# Patient Record
Sex: FEMALE | Race: WHITE | Employment: OTHER | URBAN - METROPOLITAN AREA
[De-identification: names, ages, dates, MRNs, and addresses within clinical notes are randomized per-mention and may not be internally consistent; named-entity substitution may affect disease eponyms.]

---

## 2017-11-13 ENCOUNTER — TRANSFERRED RECORDS (OUTPATIENT)
Dept: HEALTH INFORMATION MANAGEMENT | Facility: CLINIC | Age: 72
End: 2017-11-13

## 2018-05-23 ENCOUNTER — OFFICE VISIT (OUTPATIENT)
Dept: FAMILY MEDICINE | Facility: CLINIC | Age: 73
End: 2018-05-23
Payer: COMMERCIAL

## 2018-05-23 VITALS
BODY MASS INDEX: 24.75 KG/M2 | SYSTOLIC BLOOD PRESSURE: 118 MMHG | WEIGHT: 145 LBS | HEIGHT: 64 IN | OXYGEN SATURATION: 99 % | DIASTOLIC BLOOD PRESSURE: 67 MMHG | HEART RATE: 62 BPM | TEMPERATURE: 97.7 F

## 2018-05-23 DIAGNOSIS — F33.9 EPISODE OF RECURRENT MAJOR DEPRESSIVE DISORDER, UNSPECIFIED DEPRESSION EPISODE SEVERITY (H): ICD-10-CM

## 2018-05-23 DIAGNOSIS — Z76.89 ESTABLISHING CARE WITH NEW DOCTOR, ENCOUNTER FOR: Primary | ICD-10-CM

## 2018-05-23 DIAGNOSIS — R41.3 MEMORY LOSS: ICD-10-CM

## 2018-05-23 DIAGNOSIS — I10 ESSENTIAL HYPERTENSION: ICD-10-CM

## 2018-05-23 DIAGNOSIS — E78.5 HYPERLIPIDEMIA, UNSPECIFIED HYPERLIPIDEMIA TYPE: ICD-10-CM

## 2018-05-23 DIAGNOSIS — H81.09 MENIERE'S DISEASE, UNSPECIFIED LATERALITY: ICD-10-CM

## 2018-05-23 PROCEDURE — 99204 OFFICE O/P NEW MOD 45 MIN: CPT | Performed by: INTERNAL MEDICINE

## 2018-05-23 RX ORDER — HYDROCHLOROTHIAZIDE 25 MG/1
25 TABLET ORAL DAILY
Qty: 90 TABLET | Refills: 3 | Status: SHIPPED | OUTPATIENT
Start: 2018-05-23

## 2018-05-23 NOTE — MR AVS SNAPSHOT
After Visit Summary   5/23/2018    Ashli Moscoso    MRN: [id number]           Patient Information     Date Of Birth          1945        Visit Information        Provider Department      5/23/2018 11:20 AM Gauri Bolton MD [facility]        Today's Diagnoses     Establishing care with new doctor, encounter for    -  1    Meniere's disease, unspecified laterality        Hyperlipidemia, unspecified hyperlipidemia type        Essential hypertension        Memory loss        Episode of recurrent major depressive disorder, unspecified depression episode severity (H)           Follow-ups after your visit        Follow-up notes from your care team     Return in about 3 months (around 8/23/2018).      Your next 10 appointments already scheduled     Jul 23, 2018 12:40 PM CDT   Office Visit with Gauri Bolton MD   [facility]    [address] [facility] [id number] [phone]           Bring a current list of meds and any records pertaining to this visit. For Physicals, please bring immunization records and any forms needing to be filled out. Please arrive 10 minutes early to complete paperwork.              Who to contact     If you have questions or need follow up information about today's clinic visit or your schedule please contact [facility] directly at [phone].  Normal or non-critical lab and imaging results will be communicated to you by MyChart, letter or phone within 4 business days after the clinic has received the results. If you do not hear from us within 7 days, please contact the clinic through MyChart or phone. If you have a critical or abnormal lab result, we will notify you by phone as soon as possible.  Submit refill requests through Tablo or call your pharmacy and they will forward the refill request to us. Please allow 3 business days for your refill to be completed.          Additional Information  "About Your Visit        Care EveryWhere ID     This is your Care EveryWhere ID. This could be used by other organizations to access your [place] medical records  [id number]        Your Vitals Were     Pulse Temperature Height Pulse Oximetry BMI (Body Mass Index)       62 97.7  F (36.5  C) (Oral) 5' 3.8\" (1.621 m) 99% 25.05 kg/m2        Blood Pressure from Last 3 Encounters:   05/23/18 118/67   02/24/12 136/80    Weight from Last 3 Encounters:   05/23/18 145 lb (65.8 kg)   02/24/12 188 lb 12.8 oz (85.6 kg)              Today, you had the following     No orders found for display         Where to get your medicines      These medications were sent to [facility] [address] [place] [id number]     Phone:  [phone]     hydrochlorothiazide 25 MG tablet          Primary Care Provider Office Phone # Fax #    Gauri Slade Bolton [phone] [address] [address]        Equal Access to Services     [facility]: Hadii aad ku hadasho Sogamal, waaxda luqadaha, qaybta kaalmada adesusanyada, jeffery hall . So [facility] [phone].    ATENCIÓN: Si habla español, tiene a vides disposición servicios gratuitos de asistencia lingüística. Llame al [phone].    We comply with applicable federal civil rights laws and [place] laws. We do not discriminate on the basis of race, color, national origin, age, disability, sex, sexual orientation, or gender identity.            Thank you!     Thank you for choosing [facility]  for your care. Our goal is always to provide you with excellent care. Hearing back from our patients is one way we can continue to improve our services. Please take a few minutes to complete the written survey that you may receive in the mail after your visit with us. Thank you!             Your Updated Medication List - Protect others around you: Learn how to safely " use, store and throw away your medicines at www.disposemymeds.org.          This list is accurate as of 5/23/18 11:59 PM.  Always use your most recent med list.                   Brand Name Dispense Instructions for use Diagnosis    acyclovir 5 % ointment    ZOVIRAX    15 g    Apply  topically 6 times daily.    Herpes stomatitis       CITRACAL + D PO      Take  by mouth.        CLARITIN 10 MG tablet   Generic drug:  loratadine      Take 10 mg by mouth daily.        escitalopram 10 MG tablet    LEXAPRO    90 tablet    Take 1 tablet by mouth daily.    Depressive disorder, not elsewhere classified       fenofibrate 160 MG tablet     90 tablet    Take 1 tablet by mouth daily.    Hyperlipidemia       hydrochlorothiazide 25 MG tablet    HYDRODIURIL    90 tablet    Take 1 tablet (25 mg) by mouth daily    Essential hypertension       lisinopril 10 MG tablet    PRINIVIL/ZESTRIL    90 tablet    Take 1 tablet by mouth daily.    Unspecified essential hypertension       meclizine 25 MG tablet    ANTIVERT     Take 25 mg by mouth 2 times daily.        metoprolol tartrate 50 MG tablet    LOPRESSOR    60 tablet    Take 1 tablet by mouth 2 times daily.    Essential hypertension       niacin 100 MG tablet      Take 100 mg by mouth 2 times daily.        NYSTOP 801766 UNIT/GM Powd   Generic drug:  nystatin     2 Bottle    Externally apply 15 g topically 2 times daily.    Intertrigo       oxazepam 10 MG capsule    SERAX     Take 10 mg by mouth nightly as needed.        simvastatin 20 MG tablet    ZOCOR    90 tablet    Take 1 tablet by mouth At Bedtime.    Hyperlipidemia       SLOW  (50 Fe) MG tablet   Generic drug:  ferrous sulfate Dried      Take 1 tablet by mouth daily (with breakfast).

## 2018-05-23 NOTE — Clinical Note
Please abstract the following data from this visit with this patient into the appropriate field in Epic:  Mammogram done on this date: 11/13/2017 (approximately), by this group: [facility]

## 2018-05-23 NOTE — PROGRESS NOTES
SUBJECTIVE:   Ashli Moscoso is a 72 year old female who presents to clinic today for the following health issues:      New Patient/Transfer of Care  Establish care    HPI:   Patient Ashli Moscoso is a very pleasant 72 year old female with history of Meniere's Disease who presents to Internal Medicine clinic today to establish care and for follow up of multiple concerns. Regarding the patient's chronic Meniere's disease in both ears, the patient's symptoms are currently stable on Meclizine medication. Regarding the patient's chronic hyperlipidemia, the patient is compliant with her Simvastatin, niacin and Fenofibrate medications. Patient's HTN is currently well controlled. Patient's depression symptoms are currently well controlled on the Lexapro medication. Patient also has chronic mild memory loss symptoms with a known family history of dementia. Patient's father previously had dementia. Patient is not interested in a neurology specialist referral at this time for her memory loss symptoms. Patient denies any chest pain, headaches, fever or chills.        Current Medications:     Current Outpatient Prescriptions   Medication Sig Dispense Refill     acyclovir (ZOVIRAX) 5 % ointment Apply  topically 6 times daily. 15 g 3     Calcium Citrate-Vitamin D (CITRACAL + D PO) Take  by mouth.       escitalopram (LEXAPRO) 10 MG tablet Take 1 tablet by mouth daily. 90 tablet 1     fenofibrate 160 MG tablet Take 1 tablet by mouth daily. 90 tablet 0     Ferrous Sulfate Dried (SLOW FE) 160 (50 FE) MG tablet Take 1 tablet by mouth daily (with breakfast).       hydrochlorothiazide (HYDRODIURIL) 25 MG tablet Take 1 tablet (25 mg) by mouth daily 90 tablet 3     lisinopril (PRINIVIL,ZESTRIL) 10 MG tablet Take 1 tablet by mouth daily. 90 tablet 1     loratadine (CLARITIN) 10 MG tablet Take 10 mg by mouth daily.       meclizine (ANTIVERT) 25 MG tablet Take 25 mg by mouth 2 times daily.       metoprolol (LOPRESSOR) 50 MG tablet  Take 1 tablet by mouth 2 times daily. 60 tablet 12     niacin 100 MG tablet Take 100 mg by mouth 2 times daily.       Nystatin (NYSTOP) 261171 UNIT/GM POWD Externally apply 15 g topically 2 times daily. 2 Bottle 6     oxazepam (SERAX) 10 MG capsule Take 10 mg by mouth nightly as needed.       simvastatin (ZOCOR) 20 MG tablet Take 1 tablet by mouth At Bedtime. 90 tablet 0     [DISCONTINUED] hydrochlorothiazide (HYDRODIURIL) 25 MG tablet Take 1 tablet by mouth daily. 90 tablet 0         Allergies:      Allergies   Allergen Reactions     Azithromycin Unknown     Bacitracin Unknown     Ciprofloxacin Unknown     Patient does not want to be prescribed this medication     Codeine Unknown     Diazepam Unknown     Epinephrine Other (See Comments)     Can have a small dose     Moxifloxacin Unknown     Prochlorperazine Unknown     Rofecoxib Unknown     Sulfa Drugs      Latex Rash            Past Medical History:     Past Medical History:   Diagnosis Date     Hyperlipidemia      Meniere's disease      Unspecified essential hypertension          Past Surgical History:     Past Surgical History:   Procedure Laterality Date     HC REPAIR FEMORAL HERNIA,BRENDA           Family Medical History:     Family History   Problem Relation Age of Onset     HEART DISEASE Mother      Dementia Father          Social History:     Social History     Social History     Marital status:      Spouse name: N/A     Number of children: N/A     Years of education: N/A     Occupational History     Not on file.     Social History Main Topics     Smoking status: Never Smoker     Smokeless tobacco: Never Used     Alcohol use No     Drug use: No     Sexual activity: Not Currently     Partners: Male     Other Topics Concern     Not on file     Social History Narrative           Review of System:     Constitutional: Negative for fever or chills  Skin: Negative for rashes  Ears/Nose/Throat: Negative for nasal congestion, sore throat. Positive for chronic  "meniere's disease of both ears  Respiratory: No shortness of breath, dyspnea on exertion, cough, or hemoptysis  Cardiovascular: Negative for chest pain  Gastrointestinal: Negative for nausea, vomiting  Genitourinary: Negative for dysuria, hematuria  Musculoskeletal: Negative for myalgias  Neurologic: Negative for headaches, positive for chronic mild short term memory loss  Psychiatric: Positive for depression  Hematologic/Lymphatic/Immunologic: Negative  Endocrine: Negative  Behavioral: Negative for tobacco use       Physical Exam:   /67 (BP Location: Right arm, Cuff Size: Adult Regular)  Pulse 62  Temp 97.7  F (36.5  C) (Oral)  Ht 5' 3.8\" (1.621 m)  Wt 145 lb (65.8 kg)  SpO2 99%  BMI 25.05 kg/m2    GENERAL: alert and no distress  EYES: eyes grossly normal to inspection, and conjunctivae and sclerae normal  HENT: Normocephalic atraumatic. Nose and mouth without ulcers or lesions. No signs of vertigo.  NECK: supple  RESP: lungs clear to auscultation   CV: regular rate and rhythm, normal S1 S2  LYMPH: no peripheral edema   ABDOMEN: nondistended  MS: no gross musculoskeletal defects noted  SKIN: no suspicious lesions or rashes  NEURO: Alert & Oriented x 3. Short term memory loss symptoms present.  PSYCH: mentation appears normal, affect normal        Diagnostic Test Results:     Diagnostic Test Results:  Results for orders placed or performed in visit on 12/09/11   iFOB - Immunlogical Fecal Occult (CIM)   Result Value Ref Range    Occult Blood NEG neg       ASSESSMENT/PLAN:       (Z76.89) Establishing care with new doctor, encounter for  (primary encounter diagnosis)  (H81.09) Meniere's disease, unspecified laterality  Comment: chronic Meniere's disease, currently stable on Meclizine medication  Plan: continue Meclizine medication going forward.      (E78.5) Hyperlipidemia, unspecified hyperlipidemia type  Comment: stable on current cholesterol medication regimen. patient's compliant with her cholesterol " medications including niacin, simvastatin, fenofibrate.   Plan: Continue current cholesterol medications going forward.      (I10) Essential hypertension  Comment: BP currently well controlled  Plan: I have ordered hydrochlorothiazide (HYDRODIURIL) 25 MG tablet refill today.      (R41.3) Memory loss  Comment: chronic mild memory loss symptoms with a known family history of dementia. Patient's father previously had dementia.  Plan: patient declined a neurology specialist clinic referral at this time for memory loss evaluation.      (F33.9) Episode of recurrent major depressive disorder, unspecified depression episode severity (H)  Comment: patient's depression symptoms are currently well controlled.  Plan: Continue current Lexapro antidepressant medication for treatment going forward.      Follow Up Plan:     Patient is instructed to return to Internal Medicine clinic for follow-up visit in 3 months.        Gauri Bolton MD  Internal Medicine  [facility]

## 2018-05-24 PROBLEM — F33.9 EPISODE OF RECURRENT MAJOR DEPRESSIVE DISORDER, UNSPECIFIED DEPRESSION EPISODE SEVERITY (H): Status: ACTIVE | Noted: 2018-05-24

## 2018-06-18 ENCOUNTER — TELEPHONE (OUTPATIENT)
Dept: FAMILY MEDICINE | Facility: CLINIC | Age: 73
End: 2018-06-18

## 2018-06-18 DIAGNOSIS — R42 DIZZINESS: Primary | ICD-10-CM

## 2018-06-18 NOTE — TELEPHONE ENCOUNTER
Reason for Call: Request for an order or referral:    Order or referral being requested: Neurologist    Date needed: at your convenience    Has the patient been seen by the PCP for this problem? YES    Additional comments: Please leave the name and number of the Neurologist they discussed at the time of the appointment.    Phone number Patient can be reached at:  Home number on file [phone] (home)    Best Time:  Any     Can we leave a detailed message on this number?  YES    Call taken on 6/18/2018 at 9:24 AM by Marta Blackwood

## 2018-06-18 NOTE — TELEPHONE ENCOUNTER
Please notify pt that new neurology referral made today and to call to schedule with    [facility]: Neurology Clinic [facility] [phone]   [email or web address]  General Neurology

## 2018-06-19 NOTE — TELEPHONE ENCOUNTER
"Pt needs the name of the person Dr. Bolton recommends. Pt needs Dr. Bolton to call him.  He wants the information \"from the horses mouth\".  He needs to know by tomorrow morning.  He will be waiting by the phone to hear from Dr. Bolton for the specific information about the name of the doctor, and the address.  Please call pt at: [phone]  "

## 2018-06-20 NOTE — TELEPHONE ENCOUNTER
Please contact this patient.  The  is desperate for this information. [phone].  This is the 's cell phone please call them.  It is ok to leave a message.  They are looking for very specific information about the referral for the neurologist.  They have been given the information that Dr. Bolton left in the message, but they want the exact name of the doctor, their location, and phone number.

## 2018-06-20 NOTE — TELEPHONE ENCOUNTER
I called Ronald, patient's  ([facility] on file), I gave him the information from the Neurology referral.   Areli Cobos MA

## 2018-06-23 ENCOUNTER — HOSPITAL ENCOUNTER (EMERGENCY)
Facility: CLINIC | Age: 73
Discharge: HOME | End: 2018-06-23
Attending: INTERNAL MEDICINE | Admitting: INTERNAL MEDICINE
Payer: MEDICARE

## 2018-06-23 ENCOUNTER — APPOINTMENT (OUTPATIENT)
Dept: CT IMAGING | Facility: CLINIC | Age: 73
End: 2018-06-23
Attending: INTERNAL MEDICINE
Payer: MEDICARE

## 2018-06-23 VITALS
TEMPERATURE: 97.5 F | OXYGEN SATURATION: 100 % | HEART RATE: 66 BPM | RESPIRATION RATE: 16 BRPM | HEIGHT: 65 IN | WEIGHT: 130 LBS | BODY MASS INDEX: 21.66 KG/M2 | SYSTOLIC BLOOD PRESSURE: 128 MMHG | DIASTOLIC BLOOD PRESSURE: 74 MMHG

## 2018-06-23 DIAGNOSIS — R41.3 MEMORY LOSS: ICD-10-CM

## 2018-06-23 LAB
ALBUMIN SERPL-MCNC: 4.1 G/DL (ref 3.4–5)
ALBUMIN UR-MCNC: NEGATIVE MG/DL
ALP SERPL-CCNC: 52 U/L (ref 40–150)
ALT SERPL W P-5'-P-CCNC: 24 U/L (ref 0–50)
ANION GAP SERPL CALCULATED.3IONS-SCNC: 11 MMOL/L (ref 3–14)
APPEARANCE UR: CLEAR
AST SERPL W P-5'-P-CCNC: 18 U/L (ref 0–45)
BACTERIA #/AREA URNS HPF: ABNORMAL /HPF
BASOPHILS # BLD AUTO: 0 10E9/L (ref 0–0.2)
BASOPHILS NFR BLD AUTO: 0.3 %
BILIRUB SERPL-MCNC: 0.5 MG/DL (ref 0.2–1.3)
BILIRUB UR QL STRIP: NEGATIVE
BUN SERPL-MCNC: 18 MG/DL (ref 7–30)
CALCIUM SERPL-MCNC: 9.2 MG/DL (ref 8.5–10.1)
CHLORIDE SERPL-SCNC: 102 MMOL/L (ref 94–109)
CO2 SERPL-SCNC: 26 MMOL/L (ref 20–32)
COLOR UR AUTO: ABNORMAL
CREAT SERPL-MCNC: 0.76 MG/DL (ref 0.52–1.04)
DIFFERENTIAL METHOD BLD: NORMAL
EOSINOPHIL # BLD AUTO: 0.2 10E9/L (ref 0–0.7)
EOSINOPHIL NFR BLD AUTO: 3.7 %
ERYTHROCYTE [DISTWIDTH] IN BLOOD BY AUTOMATED COUNT: 12.7 % (ref 10–15)
GFR SERPL CREATININE-BSD FRML MDRD: 75 ML/MIN/1.7M2
GLUCOSE SERPL-MCNC: 98 MG/DL (ref 70–99)
GLUCOSE UR STRIP-MCNC: NEGATIVE MG/DL
HCT VFR BLD AUTO: 36.9 % (ref 35–47)
HGB BLD-MCNC: 12.8 G/DL (ref 11.7–15.7)
HGB UR QL STRIP: NEGATIVE
IMM GRANULOCYTES # BLD: 0 10E9/L (ref 0–0.4)
IMM GRANULOCYTES NFR BLD: 0.3 %
KETONES UR STRIP-MCNC: NEGATIVE MG/DL
LEUKOCYTE ESTERASE UR QL STRIP: NEGATIVE
LYMPHOCYTES # BLD AUTO: 1.5 10E9/L (ref 0.8–5.3)
LYMPHOCYTES NFR BLD AUTO: 23.3 %
MCH RBC QN AUTO: 31.8 PG (ref 26.5–33)
MCHC RBC AUTO-ENTMCNC: 34.7 G/DL (ref 31.5–36.5)
MCV RBC AUTO: 92 FL (ref 78–100)
MONOCYTES # BLD AUTO: 0.3 10E9/L (ref 0–1.3)
MONOCYTES NFR BLD AUTO: 4.3 %
MUCOUS THREADS #/AREA URNS LPF: PRESENT /LPF
NEUTROPHILS # BLD AUTO: 4.2 10E9/L (ref 1.6–8.3)
NEUTROPHILS NFR BLD AUTO: 68.1 %
NITRATE UR QL: NEGATIVE
NRBC # BLD AUTO: 0 10*3/UL
NRBC BLD AUTO-RTO: 0 /100
PH UR STRIP: 6.5 PH (ref 5–7)
PLATELET # BLD AUTO: 273 10E9/L (ref 150–450)
POTASSIUM SERPL-SCNC: 3.4 MMOL/L (ref 3.4–5.3)
PROT SERPL-MCNC: 6.7 G/DL (ref 6.8–8.8)
RBC # BLD AUTO: 4.03 10E12/L (ref 3.8–5.2)
RBC #/AREA URNS AUTO: <1 /HPF (ref 0–2)
SODIUM SERPL-SCNC: 138 MMOL/L (ref 133–144)
SOURCE: ABNORMAL
SP GR UR STRIP: 1 (ref 1–1.03)
SQUAMOUS #/AREA URNS AUTO: <1 /HPF (ref 0–1)
TROPONIN I SERPL-MCNC: <0.015 UG/L (ref 0–0.04)
TSH SERPL DL<=0.005 MIU/L-ACNC: 2.31 MU/L (ref 0.4–4)
UROBILINOGEN UR STRIP-MCNC: NORMAL MG/DL (ref 0–2)
VIT B12 SERPL-MCNC: 1176 PG/ML (ref 193–986)
WBC # BLD AUTO: 6.2 10E9/L (ref 4–11)
WBC #/AREA URNS AUTO: <1 /HPF (ref 0–5)

## 2018-06-23 PROCEDURE — 84443 ASSAY THYROID STIM HORMONE: CPT | Performed by: INTERNAL MEDICINE

## 2018-06-23 PROCEDURE — 80053 COMPREHEN METABOLIC PANEL: CPT | Performed by: INTERNAL MEDICINE

## 2018-06-23 PROCEDURE — 93005 ELECTROCARDIOGRAM TRACING: CPT

## 2018-06-23 PROCEDURE — 82607 VITAMIN B-12: CPT | Performed by: INTERNAL MEDICINE

## 2018-06-23 PROCEDURE — 81001 URINALYSIS AUTO W/SCOPE: CPT | Performed by: INTERNAL MEDICINE

## 2018-06-23 PROCEDURE — 84484 ASSAY OF TROPONIN QUANT: CPT | Performed by: INTERNAL MEDICINE

## 2018-06-23 PROCEDURE — 99285 EMERGENCY DEPT VISIT HI MDM: CPT | Mod: 25

## 2018-06-23 PROCEDURE — 70450 CT HEAD/BRAIN W/O DYE: CPT

## 2018-06-23 PROCEDURE — 85025 COMPLETE CBC W/AUTO DIFF WBC: CPT | Performed by: INTERNAL MEDICINE

## 2018-06-23 ASSESSMENT — ENCOUNTER SYMPTOMS
NAUSEA: 0
VOMITING: 0
CONFUSION: 1
COUGH: 0
HEADACHES: 0
ABDOMINAL PAIN: 0
FEVER: 0
BACK PAIN: 0

## 2018-06-23 NOTE — ED TRIAGE NOTES
"Presents ambulatory with  and daughter  C/o memory issues off on and for over a year.   Called Dr. Lawrence ([facility]) yesterday with concerns for memory. Call returned today and was recommended to go to ER to see neurologist \"to start the process for dementia testing\"  Recently experienced some delirium while out of town at family birthday party  Did not recognize  last night.  Alert. Oriented to self, place, situation. Disoriented to time.  "

## 2018-06-23 NOTE — CONSULTS
"Neurology Consultation    Ashli Moscoso    73 year old       Reason for consult: I was asked by Dr. Lindsey to evaluate this patient for memory loss.          HPI:   Mrs. Moscoso is a 72 yo woman with h/o Meniere's disease and memory loss who presents for evaluation of memory loss.  Much of history of obtained from  and daughter.  She has had progressive memory loss for over 2 years now.  Most notably, she repeats questions and statements that her family notes that she has said in the past minute.  She also seems to get lost in the house.  Her daughter notes that this past weekend she commented on a picture of her relatives, then about 1 minute later was unsure who was in the picture. Last night her  was laying in bed and asked her to go to bed, she said \"but you're not my .\" This is the first time she has not recognized him. Later in the evening he woke to find that the TV in their room was on and she was asleep in front of another TV in the living room. Today she left a confusing message for her son in law asking whether she should call the police.  Her symptoms have improved since.  Family notes that her symptoms generally get worse at night.  They called her ENT physician who told her to go to the emergency department for dementia workup. She has occasional shortness of breath.  No fever, chest pain, jaundice, or easy bruising or bleeding.  No recent falls.                 Past Medical History:     Past Medical History:   Diagnosis Date     Hyperlipidemia      Meniere's disease      Unspecified essential hypertension               Past Surgical History:     Past Surgical History:   Procedure Laterality Date     HC REPAIR FEMORAL HERNIA,BRENDA                Social History:     Social History   Substance Use Topics     Smoking status: Never Smoker     Smokeless tobacco: Never Used     Alcohol use No              Family History:     Family History   Problem Relation Age of Onset     HEART " "DISEASE Mother      Dementia Father               Allergies:     Allergies   Allergen Reactions     Azithromycin Unknown     Bacitracin Unknown     Ciprofloxacin Unknown     Patient does not want to be prescribed this medication     Codeine Unknown     Diazepam Unknown     Epinephrine Other (See Comments)     Can have a small dose     Moxifloxacin Unknown     Prochlorperazine Unknown     Rofecoxib Unknown     Sulfa Drugs      Latex Rash             Medications:     No current facility-administered medications for this encounter.      Current Outpatient Prescriptions   Medication Sig     ASPIRIN PO Take 81 mg by mouth daily     acyclovir (ZOVIRAX) 5 % ointment Apply  topically 6 times daily.     Calcium Citrate-Vitamin D (CITRACAL + D PO) Take  by mouth.     escitalopram (LEXAPRO) 10 MG tablet Take 1 tablet by mouth daily.     fenofibrate 160 MG tablet Take 1 tablet by mouth daily.     Ferrous Sulfate Dried (SLOW FE) 160 (50 FE) MG tablet Take 1 tablet by mouth daily (with breakfast).     hydrochlorothiazide (HYDRODIURIL) 25 MG tablet Take 1 tablet (25 mg) by mouth daily     lisinopril (PRINIVIL,ZESTRIL) 10 MG tablet Take 1 tablet by mouth daily.     loratadine (CLARITIN) 10 MG tablet Take 10 mg by mouth daily.     meclizine (ANTIVERT) 25 MG tablet Take 25 mg by mouth 2 times daily.     metoprolol (LOPRESSOR) 50 MG tablet Take 1 tablet by mouth 2 times daily.     niacin 100 MG tablet Take 100 mg by mouth 2 times daily.     Nystatin (NYSTOP) 882590 UNIT/GM POWD Externally apply 15 g topically 2 times daily.     oxazepam (SERAX) 10 MG capsule Take 10 mg by mouth nightly as needed.     simvastatin (ZOCOR) 20 MG tablet Take 1 tablet by mouth At Bedtime.              Review of Systems:   Review of systems negative unless noted in HPI          Physical Exam:   Vital signs:  Temp: 97.5  F (36.4  C)   BP: 143/68 Pulse: 66   Resp: 16 SpO2: 100 % O2 Device: None (Room air)   Height: 165.1 cm (5' 5\") Weight: 59 kg (130 " "lb)  Estimated body mass index is 21.63 kg/(m^2) as calculated from the following:    Height as of this encounter: 1.651 m (5' 5\").    Weight as of this encounter: 59 kg (130 lb).      Constitutional : well-built  Head: atraumatic, anicteric. See neuroexam  Eyes: see neuroexam  CVC: RRR  LUng: No respiratory distress.   Adomen: soft, non tender, bowel movements heard  Extremities: Pulses preserved in four extremities. No edema. Well perfused  Psychiatry: in good mood. Collaborative  -Neurological:     --MS: Patient is alert, attentive, and oriented. Speech is clear and fluid. Registration intact but only recalls 1 of 3 words after distraction.    --CNs: Visual fields are full to confrontation. Normal fundoscopic exam with no visualized vascular changes. Pupils are briskly reactive to light. Visual fields full. Ocular motility full without nystagmus, facial sensation intact, muscles of mastication and facial expression normal, hearing intact, gag and palate elevation normal, sternomastoid and trapezius function normal, tongue motions normal     --Motor: Normal muscle tone and bulk. 5/5 muscle strength bilaterally     --Reflexes: 3+ symmetric throughout. Flores present bilaterally. Toes downgoing bilaterally. Glabellar and palmomental absent.    --Sensory: Light touch, vibration and PP intact bilaterally in upper and lower extremities.  Hot/cold difficult to interpret.    --Coordination: Heel-shin and finger-nose-finger is intact. Negative Romberg.     --Gait: Stands with feet normally spaced. Gait is steady.              Data:   All laboratory and imaging data in the past 24 hours reviewed  All cardiac studies reviewed by me.  All imaging studies reviewed by me.         Assessment and Plan:   #Memory difficulties  Ms. Moscoso's history is consistent with dementia, most likely alzheimer's.  The 2 year history and the pattern of her deficits do not raise concern for acute process.  She can safely discharge from the " emergency department with outpatient neurology follow up.  She eats plenty of meat making B12 deficiency less likely, though this should be assessed for.  Also, she should have TSH checked. Flores likely secondary to frontal release.    -TSH  -B12  -f/u neurology clinic       Paul Ramirez  PGY2 Neurology  [id number]    Pt not seen. Case discussed with Dr. Ramirez by phone. Agree with his assessment and recommendations.  INGRIS Ordonez MD

## 2018-06-23 NOTE — ED PROVIDER NOTES
"    [place] EMERGENCY DEPARTMENT ([facility])  6/23/18   History     Chief Complaint   Patient presents with     Memory Loss     HPI  Ashli Moscoso is a 73 year old female with a medical history significant for Ménière's disease and memory loss who presents to the Emergency Department for evaluation of worsening memory loss.  Patient has had memory issues on and off for the past few years and it has been getting worse more recently.  The patient's last night did not recognize her  which caused them to call their ENT doctor, Dr. Lawrence, and was recommended to come to the ER to see neurologist to \"start the process for dementia testing\".  The family also reports that the patient had increased confusion recently while they were out of town.  The patient is having difficulty remembering specific events here in the Emergency Department and is repeating things.  Patient reports she has been taking all her medications as prescribed.  She denies any acute symptoms including cough, chest pain, fevers, nausea, vomiting, abdominal pain, back pain or headaches.    I have reviewed the Medications, Allergies, Past Medical and Surgical History, and Social History in the rSmart system.    Past Medical History:   Diagnosis Date     Hyperlipidemia      Meniere's disease      Unspecified essential hypertension        Past Surgical History:   Procedure Laterality Date     HC REPAIR FEMORAL HERNIA,BRENDA         Family History   Problem Relation Age of Onset     HEART DISEASE Mother      Dementia Father        Social History   Substance Use Topics     Smoking status: Never Smoker     Smokeless tobacco: Never Used     Alcohol use No       No current facility-administered medications for this encounter.      Current Outpatient Prescriptions   Medication     ASPIRIN PO     acyclovir (ZOVIRAX) 5 % ointment     Calcium Citrate-Vitamin D (CITRACAL + D PO)     escitalopram (LEXAPRO) 10 MG tablet     fenofibrate 160 MG " "tablet     Ferrous Sulfate Dried (SLOW FE) 160 (50 FE) MG tablet     hydrochlorothiazide (HYDRODIURIL) 25 MG tablet     lisinopril (PRINIVIL,ZESTRIL) 10 MG tablet     loratadine (CLARITIN) 10 MG tablet     meclizine (ANTIVERT) 25 MG tablet     metoprolol (LOPRESSOR) 50 MG tablet     niacin 100 MG tablet     Nystatin (NYSTOP) 193359 UNIT/GM POWD     oxazepam (SERAX) 10 MG capsule     simvastatin (ZOCOR) 20 MG tablet        Allergies   Allergen Reactions     Azithromycin Unknown     Bacitracin Unknown     Ciprofloxacin Unknown     Patient does not want to be prescribed this medication     Codeine Unknown     Diazepam Unknown     Epinephrine Other (See Comments)     Can have a small dose     Moxifloxacin Unknown     Prochlorperazine Unknown     Rofecoxib Unknown     Sulfa Drugs      Latex Rash         Review of Systems   Constitutional: Negative for fever.   Respiratory: Negative for cough.    Cardiovascular: Negative for chest pain.   Gastrointestinal: Negative for abdominal pain, nausea and vomiting.   Musculoskeletal: Negative for back pain.   Neurological: Negative for headaches.   Psychiatric/Behavioral: Positive for confusion.   All other systems reviewed and are negative.      Physical Exam   BP: 143/68  Pulse: 66  Temp: 97.5  F (36.4  C)  Resp: 16  Height: 165.1 cm (5' 5\")  Weight: 59 kg (130 lb)  SpO2: 100 %      Physical Exam   Constitutional: She is oriented to person, place, and time. No distress.   HENT:   Head: Atraumatic.   Mouth/Throat: Oropharynx is clear and moist. No oropharyngeal exudate.   Eyes: Pupils are equal, round, and reactive to light. No scleral icterus.   Neck: Neck supple. No JVD present.   Cardiovascular: Normal rate, normal heart sounds and intact distal pulses.  Exam reveals no gallop and no friction rub.    No murmur heard.  Pulmonary/Chest: Effort normal and breath sounds normal. No respiratory distress. She has no wheezes. She has no rales. She exhibits no tenderness.   Abdominal: " Soft. Bowel sounds are normal. She exhibits no distension and no mass. There is no tenderness. There is no rebound and no guarding.   Musculoskeletal: She exhibits no edema or tenderness.   Neurological: She is alert and oriented to person, place, and time. No cranial nerve deficit. Coordination normal.   No short term memory   Skin: Skin is warm. No rash noted. She is not diaphoretic.   Psychiatric: She has a normal mood and affect. Her behavior is normal. Judgment and thought content normal.       ED Course   3:09 PM  The patient was seen and examined by Carrie Lindsey MD in Room ED08.     ED Course     Procedures             EKG Interpretation:      Interpreted by Carrie Lindsey MD  Time reviewed: 15:30  Symptoms at time of EKG: memory loss   Rhythm: normal sinus   Rate: 66 bpm  Axis: Normal  Ectopy: none  Conduction: normal  ST Segments/ T Waves: ST & T wave abnormality, consider inferior ischemia  Q Waves: none  Comparison to prior: No old EKG available    Clinical Impression: ST depression @ IW/LW, no old      Results for orders placed or performed during the hospital encounter of 06/23/18 (from the past 24 hour(s))   EKG 12-lead, tracing only     Status: None (Preliminary result)    Collection Time: 06/23/18  3:30 PM   Result Value Ref Range    Interpretation ECG Click View Image link to view waveform and result    CBC with platelets differential     Status: None    Collection Time: 06/23/18  3:43 PM   Result Value Ref Range    WBC 6.2 4.0 - 11.0 10e9/L    RBC Count 4.03 3.8 - 5.2 10e12/L    Hemoglobin 12.8 11.7 - 15.7 g/dL    Hematocrit 36.9 35.0 - 47.0 %    MCV 92 78 - 100 fl    MCH 31.8 26.5 - 33.0 pg    MCHC 34.7 31.5 - 36.5 g/dL    RDW 12.7 10.0 - 15.0 %    Platelet Count 273 150 - 450 10e9/L    Diff Method Automated Method     % Neutrophils 68.1 %    % Lymphocytes 23.3 %    % Monocytes 4.3 %    % Eosinophils 3.7 %    % Basophils 0.3 %    % Immature Granulocytes 0.3 %    Nucleated RBCs 0 0 /100     Absolute Neutrophil 4.2 1.6 - 8.3 10e9/L    Absolute Lymphocytes 1.5 0.8 - 5.3 10e9/L    Absolute Monocytes 0.3 0.0 - 1.3 10e9/L    Absolute Eosinophils 0.2 0.0 - 0.7 10e9/L    Absolute Basophils 0.0 0.0 - 0.2 10e9/L    Abs Immature Granulocytes 0.0 0 - 0.4 10e9/L    Absolute Nucleated RBC 0.0    Comprehensive metabolic panel     Status: Abnormal    Collection Time: 06/23/18  3:43 PM   Result Value Ref Range    Sodium 138 133 - 144 mmol/L    Potassium 3.4 3.4 - 5.3 mmol/L    Chloride 102 94 - 109 mmol/L    Carbon Dioxide 26 20 - 32 mmol/L    Anion Gap 11 3 - 14 mmol/L    Glucose 98 70 - 99 mg/dL    Urea Nitrogen 18 7 - 30 mg/dL    Creatinine 0.76 0.52 - 1.04 mg/dL    GFR Estimate 75 >60 mL/min/1.7m2    GFR Estimate If Black >90 >60 mL/min/1.7m2    Calcium 9.2 8.5 - 10.1 mg/dL    Bilirubin Total 0.5 0.2 - 1.3 mg/dL    Albumin 4.1 3.4 - 5.0 g/dL    Protein Total 6.7 (L) 6.8 - 8.8 g/dL    Alkaline Phosphatase 52 40 - 150 U/L    ALT 24 0 - 50 U/L    AST 18 0 - 45 U/L   Troponin I     Status: None    Collection Time: 06/23/18  3:43 PM   Result Value Ref Range    Troponin I ES <0.015 0.000 - 0.045 ug/L   Vitamin B12     Status: Abnormal    Collection Time: 06/23/18  3:43 PM   Result Value Ref Range    Vitamin B12 1176 (H) 193 - 986 pg/mL   CT Head w/o Contrast     Status: None    Collection Time: 06/23/18  3:59 PM    Narrative    CT HEAD W/O CONTRAST 6/23/2018 3:59 PM    Provided History: confusion;     Comparison: None.    Technique: Using multidetector thin collimation helical acquisition  technique, axial, coronal and sagittal CT images from the skull base  to the vertex were obtained without intravenous contrast.     Findings:    No intracranial hemorrhage, mass effect, or midline shift. The  ventricles are proportionate to the cerebral sulci. The gray to white  matter differentiation of the cerebral hemispheres is preserved. The  basal cisterns are patent.    The visualized paranasal sinuses are clear. The mastoid  air cells are  clear.       Impression    Impression: No acute intracranial pathology.     I have personally reviewed the examination and initial interpretation  and I agree with the findings.    ALYSIA THURSTON MD   UA with Microscopic     Status: Abnormal    Collection Time: 06/23/18  4:52 PM   Result Value Ref Range    Color Urine Light Yellow     Appearance Urine Clear     Glucose Urine Negative NEG^Negative mg/dL    Bilirubin Urine Negative NEG^Negative    Ketones Urine Negative NEG^Negative mg/dL    Specific Gravity Urine 1.005 1.003 - 1.035    Blood Urine Negative NEG^Negative    pH Urine 6.5 5.0 - 7.0 pH    Protein Albumin Urine Negative NEG^Negative mg/dL    Urobilinogen mg/dL Normal 0.0 - 2.0 mg/dL    Nitrite Urine Negative NEG^Negative    Leukocyte Esterase Urine Negative NEG^Negative    Source Unspecified Urine     WBC Urine <1 0 - 5 /HPF    RBC Urine <1 0 - 2 /HPF    Bacteria Urine Few (A) NEG^Negative /HPF    Squamous Epithelial /HPF Urine <1 0 - 1 /HPF    Mucous Urine Present (A) NEG^Negative /LPF           Labs Ordered and Resulted from Time of ED Arrival Up to the Time of Departure from the ED   COMPREHENSIVE METABOLIC PANEL - Abnormal; Notable for the following:        Result Value    Protein Total 6.7 (*)     All other components within normal limits   ROUTINE UA WITH MICROSCOPIC - Abnormal; Notable for the following:     Bacteria Urine Few (*)     Mucous Urine Present (*)     All other components within normal limits   CBC WITH PLATELETS DIFFERENTIAL   TROPONIN I   TSH WITH FREE T4 REFLEX       Consults  Neurology: Responded (06/23/18 9067)    Assessments & Plan (with Medical Decision Making)  Short term memory loss of 2-3 years, now confused and did not recognize her  yesterday but not inattentive, most likely dementia not delirium, labs, UA, CT head ok, EKG inverted T's on IW LW, Neuro conuslt done-added B12 and TFT's, follow up with Neurology.       I have reviewed the nursing notes.    I  have reviewed the findings, diagnosis, plan and need for follow up with the patient.    Discharge Medication List as of 6/23/2018  8:43 PM          Final diagnoses:   Memory loss     Chucho WEBB, am serving as a trained medical scribe to document services personally performed by Carrie Lindsey MD, based on the provider's statements to me.   Carrie WEBB MD, was physically present and have reviewed and verified the accuracy of this note documented by Chucho Mac.    6/23/2018   [facility], [place], EMERGENCY DEPARTMENT     Carrie Lindsey MD  06/23/18 7993

## 2018-06-23 NOTE — ED AVS SNAPSHOT
[facility], [place], Emergency Department    [address] [place] [address]    Phone:  [phone]                                       Ashli Moscoso   MRN: [id number]    Department:  [facility], Emergency Department   Date of Visit:  6/23/2018           After Visit Summary Signature Page     I have received my discharge instructions, and my questions have been answered. I have discussed any challenges I see with this plan with the nurse or doctor.    ..........................................................................................................................................  Patient/Patient Representative Signature      ..........................................................................................................................................  Patient Representative Print Name and Relationship to Patient    ..................................................               ................................................  Date                                            Time    ..........................................................................................................................................  Reviewed by Signature/Title    ...................................................              ..............................................  Date                                                            Time

## 2018-06-24 NOTE — DISCHARGE INSTRUCTIONS
Please make an appointment to follow up with Neurology Clinic (phone: [phone]) as soon as possible even if entirely better.

## 2018-06-25 LAB — INTERPRETATION ECG - MUSE: NORMAL

## 2018-06-28 ENCOUNTER — OFFICE VISIT (OUTPATIENT)
Dept: NEUROLOGY | Facility: CLINIC | Age: 73
End: 2018-06-28
Attending: INTERNAL MEDICINE
Payer: COMMERCIAL

## 2018-06-28 ENCOUNTER — TELEPHONE (OUTPATIENT)
Dept: FAMILY MEDICINE | Facility: CLINIC | Age: 73
End: 2018-06-28

## 2018-06-28 VITALS
DIASTOLIC BLOOD PRESSURE: 59 MMHG | BODY MASS INDEX: 23.3 KG/M2 | HEART RATE: 62 BPM | WEIGHT: 140 LBS | TEMPERATURE: 97.1 F | SYSTOLIC BLOOD PRESSURE: 149 MMHG | RESPIRATION RATE: 16 BRPM

## 2018-06-28 DIAGNOSIS — F02.80 LATE ONSET ALZHEIMER'S DISEASE WITHOUT BEHAVIORAL DISTURBANCE (H): Primary | ICD-10-CM

## 2018-06-28 DIAGNOSIS — G30.1 LATE ONSET ALZHEIMER'S DISEASE WITHOUT BEHAVIORAL DISTURBANCE (H): Primary | ICD-10-CM

## 2018-06-28 ASSESSMENT — PAIN SCALES - GENERAL: PAINLEVEL: NO PAIN (0)

## 2018-06-28 NOTE — MR AVS SNAPSHOT
After Visit Summary   6/28/2018    Ashli Moscoso    MRN: [id number]           Patient Information     Date Of Birth          1945        Visit Information        Provider Department      6/28/2018 1:45 PM Carrillo Merchant MD [facility]        Today's Diagnoses     Late onset Alzheimer's disease without behavioral disturbance    -  1      Care Instructions    Please see Dr Lawrence and ask if OK to taper off Serax and later possibly Meclizine          Follow-ups after your visit        Follow-up notes from your care team     Discussed this visit Return in about 6 weeks (around 8/9/2018).      Your next 10 appointments already scheduled     Jul 23, 2018 12:40 PM CDT   [facility]    [address] [address] [phone]           Bring a current list of meds and any records pertaining to this visit. For Physicals, please bring immunization records and any forms needing to be filled out. Please arrive 10 minutes early to complete paperwork.            Aug 09, 2018  1:45 PM CDT   Return Visit with Carrillo Merchant MD   [facility] ([facility])    [address] [address] [phone]              Who to contact     Please call your clinic at [phone] to:    Ask questions about your health    Make or cancel appointments    Discuss your medicines    Learn about your test results    Speak to your doctor            Additional Information About Your Visit        Care EveryWhere ID     This is your Care EveryWhere ID. This could be used by other organizations to access your [place] medical records  [id number]        Your Vitals Were     Pulse Temperature Respirations BMI (Body Mass Index)          62 97.1  F (36.2  C) 16 23.3 kg/m2         Blood Pressure from Last 3 Encounters:   06/28/18 149/59   06/23/18 128/74   05/23/18 118/67     Weight from Last 3 Encounters:   06/28/18 140 lb (63.5 kg)   06/23/18 130 lb (59 kg)   05/23/18 145 lb (65.8 kg)              Today, you had the following     No orders found for display       Primary Care Provider Office Phone # Fax #    Gauri Slade Bolton [phone] [address]   [place] [address]        Equal Access to Services     [facility]: Hadii aad ku hadasho Soomaali, waaxda luqadaha, qaybta kaalmada adeegyada, waxay idiin hayaan adeeg khsanjeevsam la'cathie . So [facility] [phone].    ATENCIÓN: Si danitala alison, tiene a vides disposición servicios gratuitos de asistencia lingüística. Llame al [phone].    We comply with applicable federal civil rights laws and [place] laws. We do not discriminate on the basis of race, color, national origin, age, disability, sex, sexual orientation, or gender identity.            Thank you!     Thank you for choosing [facility]  for your care. Our goal is always to provide you with excellent care. Hearing back from our patients is one way we can continue to improve our services. Please take a few minutes to complete the written survey that you may receive in the mail after your visit with us. Thank you!             Your Updated Medication List - Protect others around you: Learn how to safely use, store and throw away your medicines at www.disposemymeds.org.          This list is accurate as of 6/28/18  3:05 PM.  Always use your most recent med list.                   Brand Name Dispense Instructions for use Diagnosis    ASPIRIN PO      Take 81 mg by mouth daily        CITRACAL + D PO      Take  by mouth.        CLARITIN 10 MG tablet   Generic drug:  loratadine      Take 10 mg by mouth daily.        escitalopram 10 MG tablet    LEXAPRO    90 tablet    Take 1 tablet by mouth daily.    Depressive disorder, not elsewhere classified       fenofibrate 160 MG tablet     90 tablet    Take 1 tablet by mouth daily.    Hyperlipidemia        hydrochlorothiazide 25 MG tablet    HYDRODIURIL    90 tablet    Take 1 tablet (25 mg) by mouth daily    Essential hypertension       lisinopril 10 MG tablet    PRINIVIL/ZESTRIL    90 tablet    Take 1 tablet by mouth daily.    Unspecified essential hypertension       meclizine 25 MG tablet    ANTIVERT     Take 25 mg by mouth 2 times daily.        metoprolol tartrate 50 MG tablet    LOPRESSOR    60 tablet    Take 1 tablet by mouth 2 times daily.    Essential hypertension       niacin 100 MG tablet      Take 100 mg by mouth 2 times daily.        NYSTOP 200408 UNIT/GM Powd   Generic drug:  nystatin     2 Bottle    Externally apply 15 g topically 2 times daily.    Intertrigo       oxazepam 10 MG capsule    SERAX     Take 10 mg by mouth nightly as needed.        simvastatin 20 MG tablet    ZOCOR    90 tablet    Take 1 tablet by mouth At Bedtime.    Hyperlipidemia       SLOW  (50 Fe) MG tablet   Generic drug:  ferrous sulfate Dried      Take 1 tablet by mouth daily (with breakfast).

## 2018-06-28 NOTE — LETTER
"6/28/2018       RE: Ashli Moscoso  [address]  [place] [address]     Dear Colleague,    Thank you for referring your patient, Ashli Moscoso, to the [facility] at [facility]. Please see a copy of my visit note below.    Service Date: 06/28/2018      REASON FOR VISIT:   Ashli Moscoso is a 73-year-old female being seen for dementia.  She is accompanied by her , her daughter, Mariana, and Mariana's .      HISTORY OF PRESENT ILLNESS:   There have been noticeable issues with the patient's memory for at least the last 2 years.  She repeats questions, repeats conversations, and gets confused about events.  Also, she started misidentifying her  and her daughter.  Her symptoms seem worse at night.      About a week ago her   was lying in bed and asked her to go to bed and she said, \"But you're not my .\"  This was the first time she had not recognized him .  Later in the evening, he awoke to find that the TV in their room was on and she was asleep in front of another TV.  The following day she left a very confusing message to her son-in-law.  She asked him to call the police.      This led to her presentation at the [facility] Emergency Room where she was evaluated and saw Neurology.  She had a head CT scan done at that time that is normal.  She had laboratory work done which included a normal CBC, comprehensive metabolic panel, TSH.  B12 was 1176.      She is not driving, but relates this to Meniere's disease.  There has been no report of wandering.  Over the past couple of years, her  has taken over more of the home duties, although she still washes the clothes and does some minimal cooking.  She is independent in her self-cares.  Her mood is somewhat variable.  She is not really depressed, but more easily upset and frustrated.  There have been no acute focal neurologic symptoms.  She gets " headaches with weather change, but this is not new.      She does see Dr. Lawrence for Meniere's disease and for many years has been on a combination of meclizine and oxazepam.  She and her  report that she saw Dr. Arana at the [facility] for neurologic evaluation in recent years.  I do not have those records.  I am informed they discussed coming off the oxazepam and meclizine with Dr. Lawrence, but it was not felt prudent to do so.      PAST MEDICAL HISTORY:   Her past history is notable for Meniere's disease, hyperlipidemia, hypertension and depression.      CURRENT MEDICATIONS:     1.  Aspirin.     2. Lexapro.     3.  Fenofibrate.     4.  Hydrochlorothiazide.     5.  Lisinopril.     6.  Loratadine.     7.  Metoprolol.     8.  Niacin.     9.  Oxazepam 10 mg twice a day.   10.  Simvastatin.     11.  Calcium and vitamin D.     12.  Iron.     13.  Meclizine 25 mg twice a day.     14.  Nystatin powder.        ALLERGIES:   She has multiple allergies including azithromycin, bacitracin, ciprofloxacin, diazepam, codeine, epinephrine, moxifloxacin, prochlorperazine, rofecoxib, sulfa and latex.      FAMILY HISTORY:  Notable.  Her father had Alzheimer disease and many of his siblings had Alzheimer disease as well.      ALLERGIES:  The patient lives with her .  She does not smoke or use alcohol.      PHYSICAL EXAMINATION:   VITAL SIGNS:   The patient's heart rate is 62.  Blood pressure 149/59.   NEUROLOGIC:   She frequently repeats issues we have discussed and information she has provided me.  She misidentified her daughter as her close friend.      On bedside mental status testing, she scored 17/30.  She had errors with orientation and recall.  When asked to write a sentence, she wrote a paragraph, even though I asked her twice to just write a sentence.  Her clock drawing was abnormal.  She had difficulty placing the hands at the requested time.      Pupils are equal, round and react  well to light.  Visual fields are intact.  Fundi are unremarkable.  Cranial nerves II-XII are intact.  Motor, sensory, cerebellar and gait testing are normal.  Reflexes are 2+.  Plantar responses are flexor.      IMPRESSION:   1.  Dementia -- likely Alzheimer disease.   2.  Strong family history of Alzheimer disease.      I did discuss the diagnosis with the patient and gathered family members.      I feel it is possible the oxazepam and possibly the meclizine are somewhat contributory to her confusion, although they are not the sole source of the issue which likely is an underlying dementing illness.      I would like her to follow up with Dr. Lawrence to determine if it is feasible for her to gradually come off the oxazepam and possibly the meclizine, although this may not be possible.      We touched upon a trial of donepezil, but I would like to get the above clarified first before adding that medication.      I did discuss that there is no specific curative therapy for her dementia.  We did discuss issues that they can take to help avoid escalating any confusional episodes if and when they occur.  The family obviously is quite distressed that she is misnaming them.      I do plan to see her back in about 6 weeks.      Carrillo Arora MD      ADDENDUM:  Total visit time 45 minutes.  More than 50% of this time was spent in counseling and coordination of care.         CARRILLO ARORA MD             D: 2018   T: 2018   MT: DERICK      Name:     HUBER MCDANIELS   MRN:      [id number]        Account:      [id number]   :      1945           Service Date: 2018      Document: [id number]

## 2018-06-29 NOTE — PROGRESS NOTES
"Service Date: 06/28/2018      REASON FOR VISIT:   Ashli Moscoso is a 73-year-old female being seen for dementia.  She is accompanied by her , her daughter, Mariana, and Mariana's .      HISTORY OF PRESENT ILLNESS:   There have been noticeable issues with the patient's memory for at least the last 2 years.  She repeats questions, repeats conversations, and gets confused about events.  Also, she started misidentifying her  and her daughter.  Her symptoms seem worse at night.      About a week ago her   was lying in bed and asked her to go to bed and she said, \"But you're not my .\"  This was the first time she had not recognized him .  Later in the evening, he awoke to find that the TV in their room was on and she was asleep in front of another TV.  The following day she left a very confusing message to her son-in-law.  She asked him to call the police.      This led to her presentation at the [facility] where she was evaluated and saw Neurology.  She had a head CT scan done at that time that is normal.  She had laboratory work done which included a normal CBC, comprehensive metabolic panel, TSH.  B12 was 1176.      She is not driving, but relates this to Meniere's disease.  There has been no report of wandering.  Over the past couple of years, her  has taken over more of the home duties, although she still washes the clothes and does some minimal cooking.  She is independent in her self-cares.  Her mood is somewhat variable.  She is not really depressed, but more easily upset and frustrated.  There have been no acute focal neurologic symptoms.  She gets headaches with weather change, but this is not new.      She does see Dr. Lawrence for Meniere's disease and for many years has been on a combination of meclizine and oxazepam.  She and her  report that she saw Dr. Arana at the [facility] for neurologic " evaluation in recent years.  I do not have those records.  I am informed they discussed coming off the oxazepam and meclizine with Dr. Lawrence, but it was not felt prudent to do so.      PAST MEDICAL HISTORY:   Her past history is notable for Meniere's disease, hyperlipidemia, hypertension and depression.      CURRENT MEDICATIONS:     1.  Aspirin.     2. Lexapro.     3.  Fenofibrate.     4.  Hydrochlorothiazide.     5.  Lisinopril.     6.  Loratadine.     7.  Metoprolol.     8.  Niacin.     9.  Oxazepam 10 mg twice a day.   10.  Simvastatin.     11.  Calcium and vitamin D.     12.  Iron.     13.  Meclizine 25 mg twice a day.     14.  Nystatin powder.        ALLERGIES:   She has multiple allergies including azithromycin, bacitracin, ciprofloxacin, diazepam, codeine, epinephrine, moxifloxacin, prochlorperazine, rofecoxib, sulfa and latex.      FAMILY HISTORY:  Notable.  Her father had Alzheimer disease and many of his siblings had Alzheimer disease as well.      ALLERGIES:  The patient lives with her .  She does not smoke or use alcohol.      PHYSICAL EXAMINATION:   VITAL SIGNS:   The patient's heart rate is 62.  Blood pressure 149/59.   NEUROLOGIC:   She frequently repeats issues we have discussed and information she has provided me.  She misidentified her daughter as her close friend.      On bedside mental status testing, she scored 17/30.  She had errors with orientation and recall.  When asked to write a sentence, she wrote a paragraph, even though I asked her twice to just write a sentence.  Her clock drawing was abnormal.  She had difficulty placing the hands at the requested time.      Pupils are equal, round and react well to light.  Visual fields are intact.  Fundi are unremarkable.  Cranial nerves II-XII are intact.  Motor, sensory, cerebellar and gait testing are normal.  Reflexes are 2+.  Plantar responses are flexor.      IMPRESSION:   1.  Dementia -- likely Alzheimer disease.   2.  Strong  family history of Alzheimer disease.      I did discuss the diagnosis with the patient and gathered family members.      I feel it is possible the oxazepam and possibly the meclizine are somewhat contributory to her confusion, although they are not the sole source of the issue which likely is an underlying dementing illness.      I would like her to follow up with Dr. Lawrence to determine if it is feasible for her to gradually come off the oxazepam and possibly the meclizine, although this may not be possible.      We touched upon a trial of donepezil, but I would like to get the above clarified first before adding that medication.      I did discuss that there is no specific curative therapy for her dementia.  We did discuss issues that they can take to help avoid escalating any confusional episodes if and when they occur.  The family obviously is quite distressed that she is misnaming them.      I do plan to see her back in about 6 weeks.      Carrillo Arora MD      ADDENDUM:  Total visit time 45 minutes.  More than 50% of this time was spent in counseling and coordination of care.         CARRILLO ARORA MD             D: 2018   T: 2018   MT: DERICK      Name:     HUBER MCDANIELS   MRN:      [id number]        Account:      [id number]   :      1945           Service Date: 2018      Document: [id number]

## 2018-07-06 ENCOUNTER — TRANSFERRED RECORDS (OUTPATIENT)
Dept: HEALTH INFORMATION MANAGEMENT | Facility: CLINIC | Age: 73
End: 2018-07-06

## 2018-07-20 ENCOUNTER — TELEPHONE (OUTPATIENT)
Dept: NEUROLOGY | Facility: CLINIC | Age: 73
End: 2018-07-20

## 2018-07-20 NOTE — TELEPHONE ENCOUNTER
Nurse received following message:  Caller: Idie     Relationship to Patient: Daughter     Call Back Number: [phone]     Reason for Call: Pt's Alzheimer's disease has progressed and pt's daughter is looking for advice on what to do. Pt's daughter is wondering if they need to make a sooner appt.     Nurse returned call to daughter who indicates that patient's follow up appointment was previously moved up to 7/26/18, but is concerned about rapid deterioration over the last week and wants to know if it is ok to wait for a week.    Nurse was checking on Dr. Merchant's schedule and noted that it did not appear that he would be in on the 26th.      Message was forwarded to Dr. Merchant's care coordinator for follow up with Daughter.

## 2018-07-23 ENCOUNTER — OFFICE VISIT (OUTPATIENT)
Dept: FAMILY MEDICINE | Facility: CLINIC | Age: 73
End: 2018-07-23
Payer: COMMERCIAL

## 2018-07-23 VITALS
BODY MASS INDEX: 22.99 KG/M2 | DIASTOLIC BLOOD PRESSURE: 72 MMHG | HEIGHT: 65 IN | TEMPERATURE: 97.9 F | HEART RATE: 61 BPM | WEIGHT: 138 LBS | OXYGEN SATURATION: 100 % | SYSTOLIC BLOOD PRESSURE: 127 MMHG

## 2018-07-23 DIAGNOSIS — I10 ESSENTIAL HYPERTENSION: ICD-10-CM

## 2018-07-23 DIAGNOSIS — E78.5 HYPERLIPIDEMIA, UNSPECIFIED HYPERLIPIDEMIA TYPE: ICD-10-CM

## 2018-07-23 DIAGNOSIS — F02.81 ALZHEIMER'S DEMENTIA WITH BEHAVIORAL DISTURBANCE, UNSPECIFIED TIMING OF DEMENTIA ONSET: Primary | ICD-10-CM

## 2018-07-23 DIAGNOSIS — G30.9 ALZHEIMER'S DEMENTIA WITH BEHAVIORAL DISTURBANCE, UNSPECIFIED TIMING OF DEMENTIA ONSET: Primary | ICD-10-CM

## 2018-07-23 PROCEDURE — 99214 OFFICE O/P EST MOD 30 MIN: CPT | Performed by: INTERNAL MEDICINE

## 2018-07-23 RX ORDER — LISINOPRIL 10 MG/1
10 TABLET ORAL DAILY
Qty: 90 TABLET | Refills: 3 | Status: SHIPPED | OUTPATIENT
Start: 2018-07-23

## 2018-07-23 NOTE — PROGRESS NOTES
SUBJECTIVE:   Ashli Moscoso is a 73 year old female who presents to clinic today for the following health issues:      Alzheimer's dementia Follow-Up      Memory loss symptoms getting worse    Patient has been evaluated by the Neurology clinic at [facility] where her next follow up clinic appointment is scheduled on Thursday, 7/26/2018.    Patient and her family have already started the discussion regarding admission to a memory care nursing home       Hyperlipidemia Follow-Up      Rate your low fat/cholesterol diet?: good    Taking statin?  Yes, no muscle aches from statin    Other lipid medications/supplements?:  Fenofibrate, without side effects            Current Medications:     Current Outpatient Prescriptions   Medication Sig Dispense Refill     ASPIRIN PO Take 81 mg by mouth daily       Calcium Citrate-Vitamin D (CITRACAL + D PO) Take  by mouth.       escitalopram (LEXAPRO) 10 MG tablet Take 1 tablet by mouth daily. 90 tablet 1     fenofibrate 160 MG tablet Take 1 tablet by mouth daily. 90 tablet 0     Ferrous Sulfate Dried (SLOW FE) 160 (50 FE) MG tablet Take 1 tablet by mouth daily (with breakfast).       hydrochlorothiazide (HYDRODIURIL) 25 MG tablet Take 1 tablet (25 mg) by mouth daily 90 tablet 3     lisinopril (PRINIVIL/ZESTRIL) 10 MG tablet Take 1 tablet (10 mg) by mouth daily 90 tablet 3     loratadine (CLARITIN) 10 MG tablet Take 10 mg by mouth daily.       meclizine (ANTIVERT) 25 MG tablet Take 25 mg by mouth 2 times daily.       metoprolol (LOPRESSOR) 50 MG tablet Take 1 tablet by mouth 2 times daily. 60 tablet 12     niacin 100 MG tablet Take 100 mg by mouth 2 times daily.       Nystatin (NYSTOP) 558059 UNIT/GM POWD Externally apply 15 g topically 2 times daily. 2 Bottle 6     oxazepam (SERAX) 10 MG capsule Take 10 mg by mouth nightly as needed.       simvastatin (ZOCOR) 20 MG tablet Take 1 tablet by mouth At Bedtime. 90 tablet 0     [DISCONTINUED] lisinopril (PRINIVIL,ZESTRIL)  10 MG tablet Take 1 tablet by mouth daily. 90 tablet 1         Allergies:      Allergies   Allergen Reactions     Azithromycin Unknown     Bacitracin Unknown     Ciprofloxacin Unknown     Patient does not want to be prescribed this medication     Codeine Unknown     Diazepam Unknown     Epinephrine Other (See Comments)     Can have a small dose     Moxifloxacin Unknown     Prochlorperazine Unknown     Rofecoxib Unknown     Sulfa Drugs      Latex Rash            Past Medical History:     Past Medical History:   Diagnosis Date     Hyperlipidemia      Meniere's disease      Unspecified essential hypertension          Past Surgical History:     Past Surgical History:   Procedure Laterality Date     HC REPAIR FEMORAL HERNIA,BRENDA           Family Medical History:     Family History   Problem Relation Age of Onset     HEART DISEASE Mother      Dementia Father          Social History:     Social History     Social History     Marital status:      Spouse name: N/A     Number of children: N/A     Years of education: N/A     Occupational History     Not on file.     Social History Main Topics     Smoking status: Never Smoker     Smokeless tobacco: Never Used     Alcohol use No     Drug use: No     Sexual activity: Not Currently     Partners: Male     Other Topics Concern     Not on file     Social History Narrative           Review of System:     Constitutional: Negative for fever or chills  Skin: Negative for rashes  Ears/Nose/Throat: Negative for nasal congestion, sore throat  Respiratory: No shortness of breath, dyspnea on exertion, cough, or hemoptysis  Cardiovascular: Negative for chest pain  Gastrointestinal: Negative for nausea, vomiting  Genitourinary: Negative for dysuria, hematuria  Musculoskeletal: Negative for myalgias  Neurologic: Negative for headaches, positive for worsening Alzheimer's dementia and memory loss  Psychiatric: Positive for depression, anxiety  Hematologic/Lymphatic/Immunologic:  "Negative  Endocrine: Negative  Behavioral: Negative for tobacco use       Physical Exam:   /72 (BP Location: Right arm, Patient Position: Sitting)  Pulse 61  Temp 97.9  F (36.6  C) (Oral)  Ht 5' 5\" (1.651 m)  Wt 138 lb (62.6 kg)  SpO2 100%  Breastfeeding? No  BMI 22.96 kg/m2    GENERAL: chronically ill appearing elderly female, alert and no acute distress  EYES: eyes grossly normal to inspection, and conjunctivae and sclerae normal  HENT: Normocephalic atraumatic. Nose and mouth without ulcers or lesions  NECK: supple  RESP: lungs clear to auscultation   CV: regular rate and rhythm, normal S1 S2  LYMPH: no peripheral edema   ABDOMEN: nondistended  MS: no gross musculoskeletal defects noted  SKIN: no suspicious lesions or rashes  NEURO: profound memory loss symptoms present  PSYCH: anxious affect        Diagnostic Test Results:     Diagnostic Test Results:  Results for orders placed or performed during the hospital encounter of 06/23/18   CT Head w/o Contrast    Narrative    CT HEAD W/O CONTRAST 6/23/2018 3:59 PM    Provided History: confusion;     Comparison: None.    Technique: Using multidetector thin collimation helical acquisition  technique, axial, coronal and sagittal CT images from the skull base  to the vertex were obtained without intravenous contrast.     Findings:    No intracranial hemorrhage, mass effect, or midline shift. The  ventricles are proportionate to the cerebral sulci. The gray to white  matter differentiation of the cerebral hemispheres is preserved. The  basal cisterns are patent.    The visualized paranasal sinuses are clear. The mastoid air cells are  clear.       Impression    Impression: No acute intracranial pathology.     I have personally reviewed the examination and initial interpretation  and I agree with the findings.    ALYSIA THURSTON MD   CBC with platelets differential   Result Value Ref Range    WBC 6.2 4.0 - 11.0 10e9/L    RBC Count 4.03 3.8 - 5.2 10e12/L    " Hemoglobin 12.8 11.7 - 15.7 g/dL    Hematocrit 36.9 35.0 - 47.0 %    MCV 92 78 - 100 fl    MCH 31.8 26.5 - 33.0 pg    MCHC 34.7 31.5 - 36.5 g/dL    RDW 12.7 10.0 - 15.0 %    Platelet Count 273 150 - 450 10e9/L    Diff Method Automated Method     % Neutrophils 68.1 %    % Lymphocytes 23.3 %    % Monocytes 4.3 %    % Eosinophils 3.7 %    % Basophils 0.3 %    % Immature Granulocytes 0.3 %    Nucleated RBCs 0 0 /100    Absolute Neutrophil 4.2 1.6 - 8.3 10e9/L    Absolute Lymphocytes 1.5 0.8 - 5.3 10e9/L    Absolute Monocytes 0.3 0.0 - 1.3 10e9/L    Absolute Eosinophils 0.2 0.0 - 0.7 10e9/L    Absolute Basophils 0.0 0.0 - 0.2 10e9/L    Abs Immature Granulocytes 0.0 0 - 0.4 10e9/L    Absolute Nucleated RBC 0.0    Comprehensive metabolic panel   Result Value Ref Range    Sodium 138 133 - 144 mmol/L    Potassium 3.4 3.4 - 5.3 mmol/L    Chloride 102 94 - 109 mmol/L    Carbon Dioxide 26 20 - 32 mmol/L    Anion Gap 11 3 - 14 mmol/L    Glucose 98 70 - 99 mg/dL    Urea Nitrogen 18 7 - 30 mg/dL    Creatinine 0.76 0.52 - 1.04 mg/dL    GFR Estimate 75 >60 mL/min/1.7m2    GFR Estimate If Black >90 >60 mL/min/1.7m2    Calcium 9.2 8.5 - 10.1 mg/dL    Bilirubin Total 0.5 0.2 - 1.3 mg/dL    Albumin 4.1 3.4 - 5.0 g/dL    Protein Total 6.7 (L) 6.8 - 8.8 g/dL    Alkaline Phosphatase 52 40 - 150 U/L    ALT 24 0 - 50 U/L    AST 18 0 - 45 U/L   UA with Microscopic   Result Value Ref Range    Color Urine Light Yellow     Appearance Urine Clear     Glucose Urine Negative NEG^Negative mg/dL    Bilirubin Urine Negative NEG^Negative    Ketones Urine Negative NEG^Negative mg/dL    Specific Gravity Urine 1.005 1.003 - 1.035    Blood Urine Negative NEG^Negative    pH Urine 6.5 5.0 - 7.0 pH    Protein Albumin Urine Negative NEG^Negative mg/dL    Urobilinogen mg/dL Normal 0.0 - 2.0 mg/dL    Nitrite Urine Negative NEG^Negative    Leukocyte Esterase Urine Negative NEG^Negative    Source Unspecified Urine     WBC Urine <1 0 - 5 /HPF    RBC Urine <1 0 - 2  /HPF    Bacteria Urine Few (A) NEG^Negative /HPF    Squamous Epithelial /HPF Urine <1 0 - 1 /HPF    Mucous Urine Present (A) NEG^Negative /LPF   Troponin I   Result Value Ref Range    Troponin I ES <0.015 0.000 - 0.045 ug/L   TSH with free T4 reflex   Result Value Ref Range    TSH 2.31 0.40 - 4.00 mU/L   Vitamin B12   Result Value Ref Range    Vitamin B12 1176 (H) 193 - 986 pg/mL   EKG 12-lead, tracing only   Result Value Ref Range    Interpretation ECG Click View Image link to view waveform and result        ASSESSMENT/PLAN:       (G30.8,  F02.81) Alzheimer's dementia with behavioral disturbance, unspecified timing of dementia onset  (primary encounter diagnosis)  (E78.5) Hyperlipidemia, unspecified hyperlipidemia type  (I10) Essential hypertension  Comment: chronically worsening alzheimer's dementia symptoms. Patient is homebound due to her alzheimer's dementia and needs home health RN/PT/OT/SW assistance. Tahira is also due for a refill of Lisinopril medication  Plan:  I have refilled lisinopril (PRINIVIL/ZESTRIL) 10 MG tablet BP medication today.I have ordered HOME CARE NURSING REFERRAL for home health RN/PT/OT/SW assistance due to worsening Alzheimer's dementia.    Follow Up Plan:     Patient is instructed to return to Internal Medicine clinic for follow-up visit in 1 month.        Gauri Bolton MD  Internal Medicine  [facility]

## 2018-07-23 NOTE — MR AVS SNAPSHOT
After Visit Summary   7/23/2018    Ashli Moscoso    MRN: [id number]           Patient Information     Date Of Birth          1945        Visit Information        Provider Department      7/23/2018 12:40 PM Gauri Bolton MD [facility]        Today's Diagnoses     Alzheimer's dementia with behavioral disturbance, unspecified timing of dementia onset    -  1    Essential hypertension        Hyperlipidemia, unspecified hyperlipidemia type           Follow-ups after your visit        Additional Services     HOME CARE NURSING REFERRAL       **Order classes of: [facility], [facility] and [facility] will route to the Home Care and Hospice Referral Pool.  Home Care or Hospice will then contact the patient to schedule their appointment.**    If you do not hear from Home Care and Hospice, or you would like to call to schedule, please call the referring place of service that your provider has listed below.  ______________________________________________________________________    Your provider has referred you to: [facility]: [facility] [phone]   [email or web address]    Extended Emergency Contact Information  Primary Emergency Contact: DREW MOSCOSO  Address: [address] [place] [address] [facility]  Home Phone: [phone]  Relation: Spouse  Secondary Emergency Contact: ChrisJillian   [facility]  Mobile Phone: [phone]  Relation: Daughter    Patient Anticipated Discharge Date: TBD   RN, PT, HHA to begin 24 - 48 hours after discharge.  PLEASE EVALUATE AND TREAT (Evaluation timeline is 24 - 48 hrs. Please call if there is need for a variance to this timeline).    REASON FOR REFERRAL: Assessment & Treatment: RN, PT    ADDITIONAL SERVICES NEEDED: HHA, OT and SW    OTHER PERTINENT INFORMATION: Patient was last seen by provider on 7/23/2018 for dementia.    Current Outpatient  Prescriptions:  ASPIRIN PO, Take 81 mg by mouth daily, Disp: , Rfl:   Calcium Citrate-Vitamin D (CITRACAL + D PO), Take  by mouth., Disp: , Rfl:   escitalopram (LEXAPRO) 10 MG tablet, Take 1 tablet by mouth daily., Disp: 90 tablet, Rfl: 1  fenofibrate 160 MG tablet, Take 1 tablet by mouth daily., Disp: 90 tablet, Rfl: 0  Ferrous Sulfate Dried (SLOW FE) 160 (50 FE) MG tablet, Take 1 tablet by mouth daily (with breakfast)., Disp: , Rfl:   hydrochlorothiazide (HYDRODIURIL) 25 MG tablet, Take 1 tablet (25 mg) by mouth daily, Disp: 90 tablet, Rfl: 3  lisinopril (PRINIVIL,ZESTRIL) 10 MG tablet, Take 1 tablet by mouth daily., Disp: 90 tablet, Rfl: 1  loratadine (CLARITIN) 10 MG tablet, Take 10 mg by mouth daily., Disp: , Rfl:   meclizine (ANTIVERT) 25 MG tablet, Take 25 mg by mouth 2 times daily., Disp: , Rfl:   metoprolol (LOPRESSOR) 50 MG tablet, Take 1 tablet by mouth 2 times daily., Disp: 60 tablet, Rfl: 12  niacin 100 MG tablet, Take 100 mg by mouth 2 times daily., Disp: , Rfl:   Nystatin (NYSTOP) 547119 UNIT/GM POWD, Externally apply 15 g topically 2 times daily., Disp: 2 Bottle, Rfl: 6  oxazepam (SERAX) 10 MG capsule, Take 10 mg by mouth nightly as needed., Disp: , Rfl:   simvastatin (ZOCOR) 20 MG tablet, Take 1 tablet by mouth At Bedtime., Disp: 90 tablet, Rfl: 0      Patient Active Problem List:     Herpes stomatitis     Acute maxillary sinusitis     Essential hypertension     Hyperlipidemia     Meniere's disease     Health Care Home     Memory loss     Episode of recurrent major depressive disorder, unspecified depression episode severity (H)      Documentation of Face to Face and Certification for Home Health Services    I certify that patientAshli is under my care and that I, or a Nurse Practitioner or Physician's Assistant working with me, had a face-to-face encounter that meets the physician face-to-face encounter requirements with this patient on: 7/23/2018.    This encounter with the patient  was in whole, or in part, for the following medical condition, which is the primary reason for Home Health Care: Alzheimer's dementia, HTN, Hyperlipidemia.    I certify that, based on my findings, the following services are medically necessary Home Health Services: Nursing, Occupational Therapy and Physical Therapy    My clinical findings support the need for the above services because: Nurse is needed: To provide assessment and oversight required in the home to assure adherence to the medical plan due to: alzheimer's dementia.    Further, I certify that my clinical findings support that this patient is homebound (i.e. absences from home require considerable and taxing effort and are for medical reasons or [employer] services or infrequently or of short duration when for other reasons) because: Leaving home is medically contraindicated for the following reason(s): Alzheimer's dementia.    Based on the above findings, I certify that this patient is confined to the home and needs intermittent skilled nursing care, physical therapy and/or speech therapy.  The patient is under my care, and I have initiated the establishment of the plan of care.  This patient will be followed by a physician who will periodically review the plan of care.    Physician/Provider to provide follow up care: Gauri Bolton certified Physician at time of discharge: Gauri Bolton MD    Please be aware that coverage of these services is subject to the terms and limitations of your health insurance plan.  Call member services at your health plan with any benefit or coverage questions.                  Your next 10 appointments already scheduled     Jul 26, 2018  2:15 PM CDT   Return Visit with Carrillo eMrchant MD   [facility] ([facility])    [address] [address] [phone]              Who to contact     If you have questions or need follow up information  "about today's clinic visit or your schedule please contact [facility] directly at [phone].  Normal or non-critical lab and imaging results will be communicated to you by MyChart, letter or phone within 4 business days after the clinic has received the results. If you do not hear from us within 7 days, please contact the clinic through MyChart or phone. If you have a critical or abnormal lab result, we will notify you by phone as soon as possible.  Submit refill requests through [employer] or call your pharmacy and they will forward the refill request to us. Please allow 3 business days for your refill to be completed.          Additional Information About Your Visit        Care EveryWhere ID     This is your Care EveryWhere ID. This could be used by other organizations to access your [place] medical records  [id number]        Your Vitals Were     Pulse Temperature Height Pulse Oximetry Breastfeeding? BMI (Body Mass Index)    61 97.9  F (36.6  C) (Oral) 5' 5\" (1.651 m) 100% No 22.96 kg/m2       Blood Pressure from Last 3 Encounters:   07/23/18 127/72   06/28/18 149/59   06/23/18 128/74    Weight from Last 3 Encounters:   07/23/18 138 lb (62.6 kg)   06/28/18 140 lb (63.5 kg)   06/23/18 130 lb (59 kg)              We Performed the Following     HOME CARE NURSING REFERRAL          Where to get your medicines      These medications were sent to [facility] [address] [place] [address]  [address] [facility] [address]     Phone:  [phone]     lisinopril 10 MG tablet          Primary Care Provider Office Phone # Fax #    Gauri Slade Bolton [phone] [address] [facility] [address]  [facility] [address]        Equal Access to Services     WILLIAM MCKEON AH: Dafne Reyes, waaxda luqadaha, qaybta kaalmalisa key, jeffery del cid. So [facility] [phone].    ATENCIÓN: Si habla español, tiene a vides disposición servicios " malgorzata de asistencia lingüística. Karl han [phone].    We comply with applicable federal civil rights laws and [place] laws. We do not discriminate on the basis of race, color, national origin, age, disability, sex, sexual orientation, or gender identity.            Thank you!     Thank you for choosing [facility]  for your care. Our goal is always to provide you with excellent care. Hearing back from our patients is one way we can continue to improve our services. Please take a few minutes to complete the written survey that you may receive in the mail after your visit with us. Thank you!             Your Updated Medication List - Protect others around you: Learn how to safely use, store and throw away your medicines at www.disposemymeds.org.          This list is accurate as of 7/23/18  1:27 PM.  Always use your most recent med list.                   Brand Name Dispense Instructions for use Diagnosis    ASPIRIN PO      Take 81 mg by mouth daily        CITRACAL + D PO      Take  by mouth.        CLARITIN 10 MG tablet   Generic drug:  loratadine      Take 10 mg by mouth daily.        escitalopram 10 MG tablet    LEXAPRO    90 tablet    Take 1 tablet by mouth daily.    Depressive disorder, not elsewhere classified       fenofibrate 160 MG tablet     90 tablet    Take 1 tablet by mouth daily.    Hyperlipidemia       hydrochlorothiazide 25 MG tablet    HYDRODIURIL    90 tablet    Take 1 tablet (25 mg) by mouth daily    Essential hypertension       lisinopril 10 MG tablet    PRINIVIL/ZESTRIL    90 tablet    Take 1 tablet (10 mg) by mouth daily    Essential hypertension       meclizine 25 MG tablet    ANTIVERT     Take 25 mg by mouth 2 times daily.        metoprolol tartrate 50 MG tablet    LOPRESSOR    60 tablet    Take 1 tablet by mouth 2 times daily.    Essential hypertension       niacin 100 MG tablet      Take 100 mg by mouth 2 times daily.        NYSTOP 134139 UNIT/GM Powd   Generic drug:   nystatin     2 Bottle    Externally apply 15 g topically 2 times daily.    Intertrigo       oxazepam 10 MG capsule    SERAX     Take 10 mg by mouth nightly as needed.        simvastatin 20 MG tablet    ZOCOR    90 tablet    Take 1 tablet by mouth At Bedtime.    Hyperlipidemia       SLOW  (50 Fe) MG tablet   Generic drug:  ferrous sulfate Dried      Take 1 tablet by mouth daily (with breakfast).

## 2018-07-24 ENCOUNTER — DOCUMENTATION ONLY (OUTPATIENT)
Dept: CARE COORDINATION | Facility: CLINIC | Age: 73
End: 2018-07-24

## 2018-07-24 ENCOUNTER — TELEPHONE (OUTPATIENT)
Dept: FAMILY MEDICINE | Facility: CLINIC | Age: 73
End: 2018-07-24

## 2018-07-24 ASSESSMENT — PATIENT HEALTH QUESTIONNAIRE - PHQ9: SUM OF ALL RESPONSES TO PHQ QUESTIONS 1-9: 0

## 2018-07-24 NOTE — TELEPHONE ENCOUNTER
Reason for Call:  Home Health Care    Ulises with [facility] called regarding (reason for call):  folloe for HC      Orders are needed for this patient. None    Want Dr Bolton to confirm that he will follow for HC Services    Phone Number Homecare Nurse can be reached at: [phone]     Can we leave a detailed message on this number? YES    Best Time: Anytime    Call taken on 7/24/2018 at 9:05 AM by Suri Powell

## 2018-07-24 NOTE — PROGRESS NOTES
[facility] will be sharing updates with you on Referral requests for home care services.  This is for care coordination purposes and alert you to referral status.    Dear Dr. Gauri Bolton  Thank You for the referral for [facility], for Ashli Moscoso; MRN [id number].  At this time, [facility] is experiencing a high volume of referrals and is not able to meet your patient s needs in a safe and timely fashion for Home Care Services..  We have transferred this client to our partnering agency [facility] [phone],  Fax [phone].      Partner Agency will be calling the client to set up an assessment visit within our 48hr window.  They will follow up with the PCP later this week to establish POC orders.  Client is agreeable to this plan.  We have forwarded the MD orders, H&P, DC Summary, procedure reports, EPIC Face Sheet and financial information to our partner agency.  We have confirmed receipt of the fax and their acceptance of the referral.  Thank you again for the referral,  Zonia Gomez,   [phone]

## 2018-07-26 ENCOUNTER — OFFICE VISIT (OUTPATIENT)
Dept: NEUROLOGY | Facility: CLINIC | Age: 73
End: 2018-07-26
Payer: COMMERCIAL

## 2018-07-26 VITALS
SYSTOLIC BLOOD PRESSURE: 132 MMHG | HEIGHT: 65 IN | BODY MASS INDEX: 23.39 KG/M2 | TEMPERATURE: 98.4 F | DIASTOLIC BLOOD PRESSURE: 70 MMHG | WEIGHT: 140.4 LBS | HEART RATE: 64 BPM

## 2018-07-26 DIAGNOSIS — G30.1 LATE ONSET ALZHEIMER'S DISEASE WITHOUT BEHAVIORAL DISTURBANCE (H): Primary | ICD-10-CM

## 2018-07-26 DIAGNOSIS — F02.80 LATE ONSET ALZHEIMER'S DISEASE WITHOUT BEHAVIORAL DISTURBANCE (H): Primary | ICD-10-CM

## 2018-07-26 RX ORDER — DONEPEZIL HYDROCHLORIDE 5 MG/1
5 TABLET, FILM COATED ORAL AT BEDTIME
Qty: 30 TABLET | Refills: 1 | Status: SHIPPED | OUTPATIENT
Start: 2018-07-26 | End: 2018-10-24

## 2018-07-26 ASSESSMENT — PAIN SCALES - GENERAL: PAINLEVEL: NO PAIN (0)

## 2018-07-26 NOTE — MR AVS SNAPSHOT
"              After Visit Summary   7/26/2018    Ashli Moscoso    MRN: [id number]           Patient Information     Date Of Birth          1945        Visit Information        Provider Department      7/26/2018 2:15 PM Carrillo Merchant MD [facility]        Today's Diagnoses     Late onset Alzheimer's disease without behavioral disturbance    -  1       Follow-ups after your visit        Follow-up notes from your care team     Discussed this visit Return in about 5 weeks (around 8/30/2018).      Your next 10 appointments already scheduled     Aug 30, 2018  3:00 PM CDT   Return Visit with Carrillo eMrchant MD   [facility]    [address] [place] [address] [phone]              Who to contact     Please call your clinic at [phone] to:    Ask questions about your health    Make or cancel appointments    Discuss your medicines    Learn about your test results    Speak to your doctor            Additional Information About Your Visit        Care EveryWhere ID     This is your Care EveryWhere ID. This could be used by other organizations to access your [place] medical records  [id number]        Your Vitals Were     Pulse Temperature Height BMI (Body Mass Index)          64 98.4  F (36.9  C) (Temporal) 5' 5\" (165.1 cm) 23.36 kg/m2         Blood Pressure from Last 3 Encounters:   07/26/18 132/70   07/23/18 127/72   06/28/18 149/59    Weight from Last 3 Encounters:   07/26/18 140 lb 6.4 oz (63.7 kg)   07/23/18 138 lb (62.6 kg)   06/28/18 140 lb (63.5 kg)              Today, you had the following     No orders found for display         Today's Medication Changes          These changes are accurate as of 7/26/18  2:58 PM.  If you have any questions, ask your nurse or doctor.               Start taking these medicines.        Dose/Directions    donepezil 5 MG tablet   Commonly known as:  ARIcept   Used for:  Late onset Alzheimer's " disease without behavioral disturbance   Started by:  Carrillo Merchant MD        Dose:  5 mg   Take 1 tablet (5 mg) by mouth At Bedtime   Quantity:  30 tablet   Refills:  1            Where to get your medicines      These medications were sent to [facility] - [place], [place] - [address] [facility]  [address] [facility] [address]     Phone:  [phone]     donepezil 5 MG tablet                Primary Care Provider Office Phone # Fax #    Gauri Slade Bolton [phone] [address] [facility] [address]  [place] [address]        Equal Access to Services     [facility]: Hadii aad ku hadasho Soomaali, waaxda luqadaha, qaybta kaalmada adeegyada, jeffery hurley adesusan hall . So [facility] [phone].    ATENCIÓN: Si habla español, tiene a vides disposición servicios gratuitos de asistencia lingüística. [facility] [phone].    We comply with applicable federal civil rights laws and [place] laws. We do not discriminate on the basis of race, color, national origin, age, disability, sex, sexual orientation, or gender identity.            Thank you!     Thank you for choosing [facility] NEUROSPECIALTIES  for your care. Our goal is always to provide you with excellent care. Hearing back from our patients is one way we can continue to improve our services. Please take a few minutes to complete the written survey that you may receive in the mail after your visit with us. Thank you!             Your Updated Medication List - Protect others around you: Learn how to safely use, store and throw away your medicines at www.disposemymeds.org.          This list is accurate as of 7/26/18  2:58 PM.  Always use your most recent med list.                   Brand Name Dispense Instructions for use Diagnosis    ASPIRIN PO      Take 81 mg by mouth daily        CITRACAL + D PO      Take  by mouth.        CLARITIN 10 MG tablet   Generic drug:  loratadine      Take 10 mg by mouth daily.         donepezil 5 MG tablet    ARIcept    30 tablet    Take 1 tablet (5 mg) by mouth At Bedtime    Late onset Alzheimer's disease without behavioral disturbance       escitalopram 10 MG tablet    LEXAPRO    90 tablet    Take 1 tablet by mouth daily.    Depressive disorder, not elsewhere classified       fenofibrate 160 MG tablet     90 tablet    Take 1 tablet by mouth daily.    Hyperlipidemia       hydrochlorothiazide 25 MG tablet    HYDRODIURIL    90 tablet    Take 1 tablet (25 mg) by mouth daily    Essential hypertension       lisinopril 10 MG tablet    PRINIVIL/ZESTRIL    90 tablet    Take 1 tablet (10 mg) by mouth daily    Essential hypertension       meclizine 25 MG tablet    ANTIVERT     Take 25 mg by mouth 2 times daily.        metoprolol tartrate 50 MG tablet    LOPRESSOR    60 tablet    Take 1 tablet by mouth 2 times daily.    Essential hypertension       niacin 100 MG tablet      Take 100 mg by mouth 2 times daily.        NYSTOP 529817 UNIT/GM Powd   Generic drug:  nystatin     2 Bottle    Externally apply 15 g topically 2 times daily.    Intertrigo       oxazepam 10 MG capsule    SERAX     Take 10 mg by mouth nightly as needed.        simvastatin 20 MG tablet    ZOCOR    90 tablet    Take 1 tablet by mouth At Bedtime.    Hyperlipidemia       SLOW  (50 Fe) MG tablet   Generic drug:  ferrous sulfate Dried      Take 1 tablet by mouth daily (with breakfast).

## 2018-07-26 NOTE — PROGRESS NOTES
Service Date: 2018      INTERVAL HISTORY:   Huber Moscoso returns along with her , Edu, daughter, Mariana, and Mariana's .  She is a patient I saw on 2018 with dementia, likely representing Alzheimer disease.  She also had a history of Meniere disease.      Since seen, she did meet with Dr. Lawrence and he quickly tapered her off both oxazepam and meclizine. She has not had any vertigo. Unfortunately, this has not led to any improvement in her cognition.  She still is episodically confused.  At times she will not recognize her .  She may have some delusions on occasion as well.      Today, we discussed a trial of donepezil.  I pointed out that this is symptomatic therapy.  The hope would be that it would help with her memory.  I told them I could not guarantee that they would see a positive impact.  It is generally a well-tolerated drug, but I did review potential side effects including gastrointestinal side effects, vivid dreams, bradycardia, syncope and seizures.      It was decided to initiate a trial of donepezil.  She will start on 5 mg at night. Family and patient instructed it can be stopped if any significant side effects.     PHYSICAL EXAMINATION:   VITAL SIGNS:  Her heart rate is 64.  Blood pressure 132/70.      I will be seeing her back in a few weeks and determine at that time whether to continue the donepezil and/or increase the dose.      Carrillo Arora MD      cc:   Paul Lawrence MD   [facility]   [address] [place] [address] [address]   [place], [place] [address] [place] [address] Slade Bolton MD   [address], [address]   [place] [address]         CARRILLO ARORA MD             D: 2018   T: 2018   MT: DERICK      Name:     HUBER MOSCOSO   MRN:      [id number]        Account:      [id number]   :      1945           Service Date: 2018      Document: [id number]

## 2018-07-26 NOTE — LETTER
7/26/2018       RE: Ashli Moscoso  [address]  [place] [address]     Dear Colleague,    Thank you for referring your patient, Ashli Moscoso, to the [facility] NEUROSPECIALTIES at [facility]. Please see a copy of my visit note below.    Service Date: 07/26/2018      INTERVAL HISTORY:   Ashli Moscoso returns along with her , Edu, daughter, Mariana, and Mariana's .  She is a patient I saw on 06/28/2018 with dementia, likely representing Alzheimer disease.  She also had a history of Meniere disease.      Since seen, she did meet with Dr. Lawrence and he quickly tapered her off both oxazepam and meclizine. She has not had any vertigo. Unfortunately, this has not led to any improvement in her cognition.  She still is episodically confused.  At times she will not recognize her .  She may have some delusions on occasion as well.      Today, we discussed a trial of donepezil.  I pointed out that this is symptomatic therapy.  The hope would be that it would help with her memory.  I told them I could not guarantee that they would see a positive impact.  It is generally a well-tolerated drug, but I did review potential side effects including gastrointestinal side effects, vivid dreams, bradycardia, syncope and seizures.      It was decided to initiate a trial of donepezil.  She will start on 5 mg at night. Family and patient instructed it can be stopped if any significant side effects.     PHYSICAL EXAMINATION:   VITAL SIGNS:  Her heart rate is 64.  Blood pressure 132/70.      I will be seeing her back in a few weeks and determine at that time whether to continue the donepezil and/or increase the dose.      Carrillo Merchant MD      cc:   Paul Lawrence MD   [facility]   [address]   [place] [address]      Gauri Bolton MD   [address]   [place] [address]                     D: 2018   T: 2018   MT: DERICK      Name:     HUBER MCDANIELS   MRN:      [id number]        Account:      [id number]   :      1945           Service Date: 2018      Document: [id number]

## 2018-07-26 NOTE — LETTER
7/26/2018       RE: Ashli Moscoso  [address] [place] [address]  [place] [address]     Dear Colleague,    Thank you for referring your patient, Ashli Moscoso, to the [facility] NEUROSPECIALTIES at [facility]. Please see a copy of my visit note below.    Service Date: 07/26/2018      INTERVAL HISTORY:   Ashli Moscoso returns along with her , Edu, daughter, Mariana, and Mariana's .  She is a patient I saw on 06/28/2018 with dementia, likely representing Alzheimer disease.  She also had a history of Meniere disease.      Since seen, she did meet with Dr. Lawrence and he quickly tapered her off both oxazepam and meclizine. She has not had any vertigo. Unfortunately, this has not led to any improvement in her cognition.  She still is episodically confused.  At times she will not recognize her .  She may have some delusions on occasion as well.      Today, we discussed a trial of donepezil.  I pointed out that this is symptomatic therapy.  The hope would be that it would help with her memory.  I told them I could not guarantee that they would see a positive impact.  It is generally a well-tolerated drug, but I did review potential side effects including gastrointestinal side effects, vivid dreams, bradycardia, syncope and seizures.      It was decided to initiate a trial of donepezil.  She will start on 5 mg at night. Family and patient instructed it can be stopped if any significant side effects.     PHYSICAL EXAMINATION:   VITAL SIGNS:  Her heart rate is 64.  Blood pressure 132/70.      I will be seeing her back in a few weeks and determine at that time whether to continue the donepezil and/or increase the dose.      Carrillo Merchant MD      cc:   Paul Lawrence MD   [facility]   [address]   [place] [address]      Gauri Bolton MD   [address], [address]   [place] [address]                  D: 2018   T: 2018   MT: DERICK      Name:     HUBER MCDANIELS   MRN:      [id number]        Account:      [id number]   :      1945           Service Date: 2018      Document: [id number]

## 2018-07-27 ENCOUNTER — MEDICAL CORRESPONDENCE (OUTPATIENT)
Dept: HEALTH INFORMATION MANAGEMENT | Facility: CLINIC | Age: 73
End: 2018-07-27

## 2018-08-02 ENCOUNTER — MEDICAL CORRESPONDENCE (OUTPATIENT)
Dept: HEALTH INFORMATION MANAGEMENT | Facility: CLINIC | Age: 73
End: 2018-08-02

## 2018-08-03 ENCOUNTER — MEDICAL CORRESPONDENCE (OUTPATIENT)
Dept: HEALTH INFORMATION MANAGEMENT | Facility: CLINIC | Age: 73
End: 2018-08-03

## 2018-08-03 ENCOUNTER — OFFICE VISIT (OUTPATIENT)
Dept: FAMILY MEDICINE | Facility: CLINIC | Age: 73
End: 2018-08-03
Payer: COMMERCIAL

## 2018-08-03 ENCOUNTER — TELEPHONE (OUTPATIENT)
Dept: FAMILY MEDICINE | Facility: CLINIC | Age: 73
End: 2018-08-03

## 2018-08-03 VITALS
OXYGEN SATURATION: 100 % | HEART RATE: 59 BPM | SYSTOLIC BLOOD PRESSURE: 124 MMHG | WEIGHT: 138 LBS | TEMPERATURE: 98.5 F | HEIGHT: 65 IN | BODY MASS INDEX: 22.99 KG/M2 | DIASTOLIC BLOOD PRESSURE: 64 MMHG

## 2018-08-03 DIAGNOSIS — B35.9 TINEA: Primary | ICD-10-CM

## 2018-08-03 DIAGNOSIS — L30.4 INTERTRIGO: ICD-10-CM

## 2018-08-03 PROCEDURE — 99214 OFFICE O/P EST MOD 30 MIN: CPT | Performed by: INTERNAL MEDICINE

## 2018-08-03 RX ORDER — NYSTATIN 100000 [USP'U]/G
15 POWDER TOPICAL
Qty: 2 BOTTLE | Refills: 6 | Status: SHIPPED | OUTPATIENT
Start: 2018-08-03 | End: 2018-08-03

## 2018-08-03 RX ORDER — CLOTRIMAZOLE 1 %
CREAM (GRAM) TOPICAL 2 TIMES DAILY
Qty: 15 G | Refills: 3 | Status: SHIPPED | OUTPATIENT
Start: 2018-08-03 | End: 2019-12-04

## 2018-08-03 RX ORDER — NYSTATIN 100000 [USP'U]/G
15 POWDER TOPICAL
Qty: 2 BOTTLE | Refills: 6 | Status: ON HOLD | OUTPATIENT
Start: 2018-08-03 | End: 2019-03-11

## 2018-08-03 RX ORDER — CLOTRIMAZOLE 1 %
CREAM (GRAM) TOPICAL 2 TIMES DAILY
Qty: 15 G | Refills: 3 | Status: SHIPPED | OUTPATIENT
Start: 2018-08-03 | End: 2018-08-03

## 2018-08-03 NOTE — MR AVS SNAPSHOT
"              After Visit Summary   8/3/2018    Ashli Moscoso    MRN: [id number]           Patient Information     Date Of Birth          1945        Visit Information        Provider Department      8/3/2018 10:40 AM Gauri Bolton MD [facility]        Today's Diagnoses     Tinea    -  1    Intertrigo           Follow-ups after your visit        Your next 10 appointments already scheduled     Aug 30, 2018  3:00 PM CDT   Return Visit with Carrillo Merchant MD   [facility]    [address] [place] [id number] [phone]              Who to contact     If you have questions or need follow up information about today's clinic visit or your schedule please contact [facility] directly at [phone].  Normal or non-critical lab and imaging results will be communicated to you by MyChart, letter or phone within 4 business days after the clinic has received the results. If you do not hear from us within 7 days, please contact the clinic through MyChart or phone. If you have a critical or abnormal lab result, we will notify you by phone as soon as possible.  Submit refill requests through [employer] Website or call your pharmacy and they will forward the refill request to us. Please allow 3 business days for your refill to be completed.          Additional Information About Your Visit        Care EveryWhere ID     This is your Care EveryWhere ID. This could be used by other organizations to access your [place] medical records  [id number]        Your Vitals Were     Pulse Temperature Height Pulse Oximetry BMI (Body Mass Index)       59 98.5  F (36.9  C) (Oral) 5' 5\" (1.651 m) 100% 22.96 kg/m2        Blood Pressure from Last 3 Encounters:   08/03/18 124/64   07/26/18 132/70   07/23/18 127/72    Weight from Last 3 Encounters:   08/03/18 138 lb (62.6 kg)   07/26/18 140 lb 6.4 oz (63.7 kg)   07/23/18 138 lb (62.6 kg)              Today, " you had the following     No orders found for display         Today's Medication Changes          These changes are accurate as of 8/3/18 12:31 PM.  If you have any questions, ask your nurse or doctor.               Start taking these medicines.        Dose/Directions    clotrimazole 1 % cream   Commonly known as:  LOTRIMIN   Used for:  Intertrigo   Started by:  aGuri Bolton MD        Apply topically 2 times daily   Quantity:  15 g   Refills:  3       nystatin 299496 UNIT/GM Powd   Commonly known as:  NYSTOP   Used for:  Intertrigo   Started by:  Gauri Bolton MD        Dose:  15 g   Apply 15 g topically 2 times daily   Quantity:  2 Bottle   Refills:  6            Where to get your medicines      Some of these will need a paper prescription and others can be bought over the counter.  Ask your nurse if you have questions.     Bring a paper prescription for each of these medications     clotrimazole 1 % cream    nystatin [id number] UNIT/GM Powd                Primary Care Provider Office Phone # Fax #    Gauri Bolton [phone] [address] [address]        Equal Access to Services     [facility]: Hadii aad ku hadasho Sogamal, waaxda luqadaha, qaybta kaalmada adedorota, jeffery hall . So [facility] [phone].    ATENCIÓN: Si habla español, tiene a vides disposición servicios gratuitos de asistencia lingüística. [facility] [phone].    We comply with applicable federal civil rights laws and [place] laws. We do not discriminate on the basis of race, color, national origin, age, disability, sex, sexual orientation, or gender identity.            Thank you!     Thank you for choosing [facility]  for your care. Our goal is always to provide you with excellent care. Hearing back from our patients is one way we can continue to improve our services. Please take a few minutes to complete the written survey that you may receive in  the mail after your visit with us. Thank you!             Your Updated Medication List - Protect others around you: Learn how to safely use, store and throw away your medicines at www.disposemymeds.org.          This list is accurate as of 8/3/18 12:31 PM.  Always use your most recent med list.                   Brand Name Dispense Instructions for use Diagnosis    ASPIRIN PO      Take 81 mg by mouth daily        CITRACAL + D PO      Take  by mouth.        CLARITIN 10 MG tablet   Generic drug:  loratadine      Take 10 mg by mouth daily.        clotrimazole 1 % cream    LOTRIMIN    15 g    Apply topically 2 times daily    Intertrigo       donepezil 5 MG tablet    ARIcept    30 tablet    Take 1 tablet (5 mg) by mouth At Bedtime    Late onset Alzheimer's disease without behavioral disturbance       escitalopram 10 MG tablet    LEXAPRO    90 tablet    Take 1 tablet by mouth daily.    Depressive disorder, not elsewhere classified       fenofibrate 160 MG tablet     90 tablet    Take 1 tablet by mouth daily.    Hyperlipidemia       hydrochlorothiazide 25 MG tablet    HYDRODIURIL    90 tablet    Take 1 tablet (25 mg) by mouth daily    Essential hypertension       lisinopril 10 MG tablet    PRINIVIL/ZESTRIL    90 tablet    Take 1 tablet (10 mg) by mouth daily    Essential hypertension       meclizine 25 MG tablet    ANTIVERT     Take 25 mg by mouth 2 times daily.        metoprolol tartrate 50 MG tablet    LOPRESSOR    60 tablet    Take 1 tablet by mouth 2 times daily.    Essential hypertension       niacin 100 MG tablet      Take 100 mg by mouth 2 times daily.        nystatin [id number] UNIT/GM Powd    NYSTOP    2 Bottle    Apply 15 g topically 2 times daily    Intertrigo       oxazepam 10 MG capsule    SERAX     Take 10 mg by mouth nightly as needed.        simvastatin 20 MG tablet    ZOCOR    90 tablet    Take 1 tablet by mouth At Bedtime.    Hyperlipidemia       SLOW  (50 Fe) MG tablet   Generic drug:  ferrous sulfate  Dried      Take 1 tablet by mouth daily (with breakfast).

## 2018-08-03 NOTE — TELEPHONE ENCOUNTER
PCP see below,   Please advise on dosing   Pharmacy called to clarify - is patient to use an entire 30 grams (largest container available) daily?    Itzel MURO RN

## 2018-08-03 NOTE — PROGRESS NOTES
SUBJECTIVE:   Ashli Moscoso is a 73 year old female who presents to clinic today for the following health issues:      Rash      Duration: 2-3 weeks    Description  Location: both armpits  Itching: Mild to moderate    Intensity:  moderate    Accompanying signs and symptoms: None    History (similar episodes/previous evaluation): None    Precipitating or alleviating factors:  New exposures:  None  Recent travel: no      Therapies tried and outcome: calamine lotion      Current Medications:     Current Outpatient Prescriptions   Medication Sig Dispense Refill     ASPIRIN PO Take 81 mg by mouth daily       Calcium Citrate-Vitamin D (CITRACAL + D PO) Take  by mouth.       clotrimazole (LOTRIMIN) 1 % cream Apply topically 2 times daily 15 g 3     donepezil (ARICEPT) 5 MG tablet Take 1 tablet (5 mg) by mouth At Bedtime 30 tablet 1     escitalopram (LEXAPRO) 10 MG tablet Take 1 tablet by mouth daily. 90 tablet 1     fenofibrate 160 MG tablet Take 1 tablet by mouth daily. 90 tablet 0     Ferrous Sulfate Dried (SLOW FE) 160 (50 FE) MG tablet Take 1 tablet by mouth daily (with breakfast).       hydrochlorothiazide (HYDRODIURIL) 25 MG tablet Take 1 tablet (25 mg) by mouth daily 90 tablet 3     lisinopril (PRINIVIL/ZESTRIL) 10 MG tablet Take 1 tablet (10 mg) by mouth daily 90 tablet 3     loratadine (CLARITIN) 10 MG tablet Take 10 mg by mouth daily.       meclizine (ANTIVERT) 25 MG tablet Take 25 mg by mouth 2 times daily.       metoprolol (LOPRESSOR) 50 MG tablet Take 1 tablet by mouth 2 times daily. 60 tablet 12     niacin 100 MG tablet Take 100 mg by mouth 2 times daily.       nystatin (NYSTOP) 230870 UNIT/GM POWD Apply 15 g topically 2 times daily 2 Bottle 6     oxazepam (SERAX) 10 MG capsule Take 10 mg by mouth nightly as needed.       simvastatin (ZOCOR) 20 MG tablet Take 1 tablet by mouth At Bedtime. 90 tablet 0         Allergies:      Allergies   Allergen Reactions     Azithromycin Unknown     Bacitracin Unknown  "    Ciprofloxacin Unknown     Patient does not want to be prescribed this medication     Codeine Unknown     Diazepam Unknown     Epinephrine Other (See Comments)     Can have a small dose     Moxifloxacin Unknown     Prochlorperazine Unknown     Rofecoxib Unknown     Sulfa Drugs      Latex Rash            Past Medical History:     Past Medical History:   Diagnosis Date     Hyperlipidemia      Meniere's disease      Unspecified essential hypertension          Past Surgical History:     Past Surgical History:   Procedure Laterality Date     HC REPAIR FEMORAL HERNIA,BRENDA           Family Medical History:     Family History   Problem Relation Age of Onset     HEART DISEASE Mother      Dementia Father          Social History:     Social History     Social History     Marital status:      Spouse name: N/A     Number of children: N/A     Years of education: N/A     Occupational History     Not on file.     Social History Main Topics     Smoking status: Never Smoker     Smokeless tobacco: Never Used     Alcohol use No     Drug use: No     Sexual activity: Not Currently     Partners: Male     Other Topics Concern     Not on file     Social History Narrative           Review of System:     Constitutional: Negative for fever or chills  Skin: Positive for rashes of both armpits  Ears/Nose/Throat: Negative for nasal congestion, sore throat  Respiratory: No shortness of breath, dyspnea on exertion, cough, or hemoptysis  Cardiovascular: Negative for chest pain  Gastrointestinal: Negative for nausea, vomiting  Genitourinary: Negative for dysuria, hematuria  Musculoskeletal: Negative for myalgias  Hematologic/Lymphatic/Immunologic: Negative  Endocrine: Negative  Behavioral: Negative for tobacco use       Physical Exam:   /64 (BP Location: Right arm, Patient Position: Sitting, Cuff Size: Adult Regular)  Pulse 59  Temp 98.5  F (36.9  C) (Oral)  Ht 5' 5\" (1.651 m)  Wt 138 lb (62.6 kg)  SpO2 100%  BMI 22.96 " kg/m2    GENERAL: alert and no distress  EYES: eyes grossly normal to inspection, and conjunctivae and sclerae normal  HENT: Normocephalic atraumatic. Nose and mouth without ulcers or lesions  NECK: supple  RESP: lungs clear to auscultation   CV: regular rate and rhythm, normal S1 S2  LYMPH: no peripheral edema   ABDOMEN: nondistended  MS: no gross musculoskeletal defects noted  SKIN: Intertrigo symptoms noted in both axilla concerning for tinea skin infections        Diagnostic Test Results:     Diagnostic Test Results:  Results for orders placed or performed during the hospital encounter of 06/23/18   CT Head w/o Contrast    Narrative    CT HEAD W/O CONTRAST 6/23/2018 3:59 PM    Provided History: confusion;     Comparison: None.    Technique: Using multidetector thin collimation helical acquisition  technique, axial, coronal and sagittal CT images from the skull base  to the vertex were obtained without intravenous contrast.     Findings:    No intracranial hemorrhage, mass effect, or midline shift. The  ventricles are proportionate to the cerebral sulci. The gray to white  matter differentiation of the cerebral hemispheres is preserved. The  basal cisterns are patent.    The visualized paranasal sinuses are clear. The mastoid air cells are  clear.       Impression    Impression: No acute intracranial pathology.     I have personally reviewed the examination and initial interpretation  and I agree with the findings.    ALYSIA THURSTON MD   CBC with platelets differential   Result Value Ref Range    WBC 6.2 4.0 - 11.0 10e9/L    RBC Count 4.03 3.8 - 5.2 10e12/L    Hemoglobin 12.8 11.7 - 15.7 g/dL    Hematocrit 36.9 35.0 - 47.0 %    MCV 92 78 - 100 fl    MCH 31.8 26.5 - 33.0 pg    MCHC 34.7 31.5 - 36.5 g/dL    RDW 12.7 10.0 - 15.0 %    Platelet Count 273 150 - 450 10e9/L    Diff Method Automated Method     % Neutrophils 68.1 %    % Lymphocytes 23.3 %    % Monocytes 4.3 %    % Eosinophils 3.7 %    % Basophils 0.3 %    %  Immature Granulocytes 0.3 %    Nucleated RBCs 0 0 /100    Absolute Neutrophil 4.2 1.6 - 8.3 10e9/L    Absolute Lymphocytes 1.5 0.8 - 5.3 10e9/L    Absolute Monocytes 0.3 0.0 - 1.3 10e9/L    Absolute Eosinophils 0.2 0.0 - 0.7 10e9/L    Absolute Basophils 0.0 0.0 - 0.2 10e9/L    Abs Immature Granulocytes 0.0 0 - 0.4 10e9/L    Absolute Nucleated RBC 0.0    Comprehensive metabolic panel   Result Value Ref Range    Sodium 138 133 - 144 mmol/L    Potassium 3.4 3.4 - 5.3 mmol/L    Chloride 102 94 - 109 mmol/L    Carbon Dioxide 26 20 - 32 mmol/L    Anion Gap 11 3 - 14 mmol/L    Glucose 98 70 - 99 mg/dL    Urea Nitrogen 18 7 - 30 mg/dL    Creatinine 0.76 0.52 - 1.04 mg/dL    GFR Estimate 75 >60 mL/min/1.7m2    GFR Estimate If Black >90 >60 mL/min/1.7m2    Calcium 9.2 8.5 - 10.1 mg/dL    Bilirubin Total 0.5 0.2 - 1.3 mg/dL    Albumin 4.1 3.4 - 5.0 g/dL    Protein Total 6.7 (L) 6.8 - 8.8 g/dL    Alkaline Phosphatase 52 40 - 150 U/L    ALT 24 0 - 50 U/L    AST 18 0 - 45 U/L   UA with Microscopic   Result Value Ref Range    Color Urine Light Yellow     Appearance Urine Clear     Glucose Urine Negative NEG^Negative mg/dL    Bilirubin Urine Negative NEG^Negative    Ketones Urine Negative NEG^Negative mg/dL    Specific Gravity Urine 1.005 1.003 - 1.035    Blood Urine Negative NEG^Negative    pH Urine 6.5 5.0 - 7.0 pH    Protein Albumin Urine Negative NEG^Negative mg/dL    Urobilinogen mg/dL Normal 0.0 - 2.0 mg/dL    Nitrite Urine Negative NEG^Negative    Leukocyte Esterase Urine Negative NEG^Negative    Source Unspecified Urine     WBC Urine <1 0 - 5 /HPF    RBC Urine <1 0 - 2 /HPF    Bacteria Urine Few (A) NEG^Negative /HPF    Squamous Epithelial /HPF Urine <1 0 - 1 /HPF    Mucous Urine Present (A) NEG^Negative /LPF   Troponin I   Result Value Ref Range    Troponin I ES <0.015 0.000 - 0.045 ug/L   TSH with free T4 reflex   Result Value Ref Range    TSH 2.31 0.40 - 4.00 mU/L   Vitamin B12   Result Value Ref Range    Vitamin B12  1176 (H) 193 - 986 pg/mL   EKG 12-lead, tracing only   Result Value Ref Range    Interpretation ECG Click View Image link to view waveform and result        ASSESSMENT/PLAN:     (B35.9) Tinea  (primary encounter diagnosis)  (L30.4) Intertrigo  Comment: chronic intertrigo symptoms concerning for tinea skin infection of both axilla  Plan: I have prescribed topical antifungal medications with clotrimazole (LOTRIMIN) 1 % cream, nystatin (NYSTOP) 304143 UNIT/GM POWD for treatment.    Follow Up Plan:     Patient is instructed to return to Internal Medicine clinic for follow-up visit in 1 week.        Gauri Bolton MD  Internal Medicine  [facility]

## 2018-08-03 NOTE — TELEPHONE ENCOUNTER
Spoke Idie ( Daughter of PT) she states the PT only picked up the cream and not the powder  Which was advised by the pharmacy stating the powder would be too messy to use with the cream  The daughter is wondering if the PT should use both and if so, how to apply  **CONSENT TO COMMUNICATE ON FILE**  Ph. [phone] VM is okay

## 2018-08-03 NOTE — TELEPHONE ENCOUNTER
Reason for Call:  Med Question     Detailed comments: Spoke with Bere with [facility] pharmacy she has a question regarding the dosing on nystatin (NYSTOP) [id number] UNIT/GM POWD the Rx states to apply 15 G 2x a day but the Rx but the largest pack is only 30 G     Ph. [phone]     Best Time: any    Can we leave a detailed message on this number? NO    Call taken on 8/3/2018 at 12:30 PM by Areli Hernandes

## 2018-08-03 NOTE — TELEPHONE ENCOUNTER
Called and spoke with pharmacist  Pharmacist said PCP wants only to apply small amount twice a day  They took the cream to start - under arms, small amount  But they should fill powder per pharmacist   Pharmacist advised to do cream then powder on top until cream is used up     States she just spoke with Matthew a few minutes ago and relayed all of this    Detailed message left for Matthew asking her to call back if any further questions     Itzel MURO RN

## 2018-08-09 ENCOUNTER — TELEPHONE (OUTPATIENT)
Dept: NEUROLOGY | Facility: CLINIC | Age: 73
End: 2018-08-09

## 2018-08-09 ENCOUNTER — TELEPHONE (OUTPATIENT)
Dept: FAMILY MEDICINE | Facility: CLINIC | Age: 73
End: 2018-08-09

## 2018-08-09 NOTE — TELEPHONE ENCOUNTER
Returned call to Leonela in admissions at [facility]   She will be calling Dr. Merchant in neurology to gather more information and verify if pt had formal cognitive testing done    FYI PCP - they will be forwarding over forms for you to sign in the next couple of days    Itzel MURO RN

## 2018-08-09 NOTE — TELEPHONE ENCOUNTER
Received telephone call from patient's son, Charan (APOLLO on file). Charan is requesting a copy of Dr. Norwood notes and information (imaging, testing) on what he used to diagnose Ashli with Alzheimer's. Charan has been working to get Ashli into a memory care unit and could be admitted on Monday if the appropriate paperwork is submitted. He is requesting a letter to be written that states the diagnosis of alzheimer and recommendations to live in a memory care unit. Charan reports that living in a memory care facility was discussed at the recent office visit to which his sister had attended.     Charan would like a call back today considering this is a time sensitive matter.    Pamela Funk, RN, BSN  Nephrology Care Coordinator  [facility]

## 2018-08-09 NOTE — TELEPHONE ENCOUNTER
Charan called back again. He contacted the care facility in which the family would like to have her admitted. The family is in need of something stating that she cannot care for herself and is in need of 24 hour nursing care. Charan's step dad cannot care for her.    Pamela Funk RN

## 2018-08-09 NOTE — TELEPHONE ENCOUNTER
Spoke to patient's son Charan Garcia. Patient delusional. Threw  out of house. He was having difficulty caring for her. Plans to admit to Memory Care unit ([facility]). Will provide Charan with office notes and letter re her Dx of Alzheimer's Disease

## 2018-08-09 NOTE — TELEPHONE ENCOUNTER
A text page was sent to Dr Merchant asking him to review the son's request and advise. This pt is seen at [facility] so his nurse was CC'd on this message as well.   Marika Santamaria RN

## 2018-08-09 NOTE — TELEPHONE ENCOUNTER
Reason for Call:  Other call back    Detailed comments: Ashli is being admitted to [facility] and Fanny is calling regarding the intake paperwork. If any cognitive testing was done.     Phone Number Patient can be reached at: Other phone number:  [phone]    Best Time: anytime     Can we leave a detailed message on this number? YES    Call taken on 8/9/2018 at 3:00 PM by Radha Sarmiento
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
## 2018-08-10 NOTE — TELEPHONE ENCOUNTER
Health Call Center    Phone Message    May a detailed message be left on voicemail: yes    Reason for Call: Other: Per call from Charan is wondering if the letter and 's note are ready for him to  at the clinic. Charan is requesting a call back to discuss it ASAP due to time sensitive     Action Taken: Message routed to:  [facility] Neurology

## 2018-08-21 ENCOUNTER — CARE COORDINATION (OUTPATIENT)
Dept: NEUROLOGY | Facility: CLINIC | Age: 73
End: 2018-08-21

## 2018-08-21 ENCOUNTER — TELEPHONE (OUTPATIENT)
Dept: FAMILY MEDICINE | Facility: CLINIC | Age: 73
End: 2018-08-21

## 2018-08-21 NOTE — TELEPHONE ENCOUNTER
Pt's daughter Jillian ([facility] [phone]) calling to update  that pt moved into an assisted living today called [facility] # [phone]  Fax # [phone]    Jillian reports that [facility] has most orders from pt's neurologist  in [place] at [phone] but still needs approval for a few medications from .(The Assisted Living plans to have this pt followed by one of there doctors while living at [facility])    Need approval for: niacin 100 mg every day    Need approval for: multiple vitamin daily    Need approval for: Vitamin D3 with calcium or with out calcium 2000 international units daily    Need approval for: fenofibrate(THIS MEDICATION IS NOT ON THE CURRENT MEDICATION LIST)PLEASE REVIEW    Please fax orders to [facility] at [phone]      *Judite states that pt's neurologist discontinued meclizine and oxazepam because these meds are not good for alzheimer's patients. [facility] is requesting a PRN order for meclizine. Jillian will talk to neurology regarding this.

## 2018-08-23 ENCOUNTER — MEDICAL CORRESPONDENCE (OUTPATIENT)
Dept: HEALTH INFORMATION MANAGEMENT | Facility: CLINIC | Age: 73
End: 2018-08-23

## 2018-08-23 NOTE — TELEPHONE ENCOUNTER
Please notify assisted living at [facility] that the following orders are approved.    niacin 100 mg every day     multiple vitamin daily     Vitamin D3 with calcium 2000 international units daily     Fenofibrate daily

## 2018-08-24 ENCOUNTER — MEDICAL CORRESPONDENCE (OUTPATIENT)
Dept: HEALTH INFORMATION MANAGEMENT | Facility: CLINIC | Age: 73
End: 2018-08-24

## 2018-08-24 NOTE — TELEPHONE ENCOUNTER
Please notify [facility] that the following orders are approved.  Verbal order and faxed sign by Dr. Bolton.   [facility] # [phone]  Fax # [phone]

## 2018-08-30 ENCOUNTER — OFFICE VISIT (OUTPATIENT)
Dept: NEUROLOGY | Facility: CLINIC | Age: 73
End: 2018-08-30
Payer: COMMERCIAL

## 2018-08-30 VITALS
SYSTOLIC BLOOD PRESSURE: 110 MMHG | HEART RATE: 59 BPM | OXYGEN SATURATION: 100 % | DIASTOLIC BLOOD PRESSURE: 66 MMHG | BODY MASS INDEX: 22.63 KG/M2 | WEIGHT: 136 LBS

## 2018-08-30 DIAGNOSIS — G30.1 LATE ONSET ALZHEIMER'S DISEASE WITHOUT BEHAVIORAL DISTURBANCE (H): Primary | ICD-10-CM

## 2018-08-30 DIAGNOSIS — F02.80 LATE ONSET ALZHEIMER'S DISEASE WITHOUT BEHAVIORAL DISTURBANCE (H): Primary | ICD-10-CM

## 2018-08-30 DIAGNOSIS — F41.9 ANXIETY: ICD-10-CM

## 2018-08-30 PROCEDURE — 99213 OFFICE O/P EST LOW 20 MIN: CPT | Performed by: PSYCHIATRY & NEUROLOGY

## 2018-08-30 RX ORDER — ESCITALOPRAM OXALATE 10 MG/1
10 TABLET ORAL DAILY
Qty: 90 TABLET | Refills: 1 | Status: SHIPPED | OUTPATIENT
Start: 2018-08-30 | End: 2019-04-24

## 2018-08-30 RX ORDER — DONEPEZIL HYDROCHLORIDE 10 MG/1
10 TABLET, FILM COATED ORAL AT BEDTIME
Qty: 90 TABLET | Refills: 1 | Status: SHIPPED | OUTPATIENT
Start: 2018-08-30 | End: 2019-04-24

## 2018-08-30 RX ORDER — FENOFIBRATE 160 MG/1
160 TABLET ORAL
COMMUNITY
Start: 2017-11-30 | End: 2020-08-12

## 2018-08-30 RX ORDER — MULTIVIT WITH MINERALS/LUTEIN
1 TABLET ORAL DAILY
COMMUNITY
Start: 2012-09-14

## 2018-08-30 ASSESSMENT — PAIN SCALES - GENERAL: PAINLEVEL: NO PAIN (0)

## 2018-08-30 NOTE — NURSING NOTE
Ashli Moscoso's goals for this visit include:   Chief Complaint   Patient presents with     RECHECK     LOV 7/26/18  Alzheimer's disease      She requests these members of her care team be copied on today's visit information: no    PCP: Gauri Bolton    Referring Provider:  Referred Self, MD  No address on file    /66 (BP Location: Left arm, Patient Position: Sitting, Cuff Size: Adult Regular)  Pulse 59  Wt 61.7 kg (136 lb)  SpO2 100%  BMI 22.63 kg/m2    Do you need any medication refills at today's visit? No    Jessika Hess LPN

## 2018-08-30 NOTE — LETTER
2018         RE: Huber Moscoso  [address]  [place] [address]        Dear Colleague,    Thank you for referring your patient, Huber Moscoso, to the [facility]. Please see a copy of my visit note below.    Visit Date:   2018      HISTORY OF PRESENT ILLNESS:  Huber Moscoso returns for followup.  She is accompanied by her daughter, Jillian, and her son-in-law, Edward.  She is a patient with Alzheimer disease.  She did start on donepezil a month ago at the 5 mg dose.  She has tolerated it well.  It has not had any significant impact on her memory and cognition at this point.      Since I saw her a month ago, she has moved into the [facility].  This is assisted living with the ability to transition to memory care.  While it was a bit difficult at first, she is starting to settle in according to her daughter and son-in-law.      She has not had any attacks of vertigo related to Meniere disease and is not taking oxazepam or meclizine.      I discussed pushing the donepezil dose up to 10 mg and again reviewed side effects.  Everyone was in agreement in doing so.  She had been on Lexapro 10 mg for anxiety, but this was not restarted yet.  I think that is reasonable treatment for her of anxiety  and so I did write a new prescription in order to resume Lexapro 10 mg.      She does not need to be on any regular vestibular suppressant medications, but if she does have another attack of vertigo, meclizine 25 mg once or twice a day can be utilized.  I will be seeing her back in 2 months.         GAGE ARORA MD             D: 2018   T: 2018   MT: CLAUDY      Name:     HUBER MOSCOSO   MRN:      [id number]        Account:      [id number]   :      1945           Visit Date:   2018      Document: [id number]        Again, thank you for allowing me to participate in the care of your patient.        Sincerely,        Gage Arora MD

## 2018-08-30 NOTE — TELEPHONE ENCOUNTER
Facility is requesting clarification of orders;     Spoke with pt's : Fenofibrate 160mg    PCP clarified- only needs to take Caclium Citrate-Vitamin D.     Orders faxed back to facility- fax placed in [facility] accordion file.     Lacie CHUNG RN

## 2018-08-30 NOTE — MR AVS SNAPSHOT
After Visit Summary   2018    Ashli Moscoso    MRN: [id number]           Patient Information     Date Of Birth          1945        Visit Information        Provider Department      2018 3:00 PM Carrillo Merchant MD [facility]        Today's Diagnoses     Late onset Alzheimer's disease without behavioral disturbance    -  1    Anxiety           Follow-ups after your visit        Follow-up notes from your care team     Discussed this visit Return in about 8 weeks (around 10/25/2018).      Who to contact     If you have questions or need follow up information about today's clinic visit or your schedule please contact [facility] directly at [phone].  Normal or non-critical lab and imaging results will be communicated to you by MyChart, letter or phone within 4 business days after the clinic has received the results. If you do not hear from us within 7 days, please contact the clinic through MyChart or phone. If you have a critical or abnormal lab result, we will notify you by phone as soon as possible.  Submit refill requests through GT[employer] or call your pharmacy and they will forward the refill request to us. Please allow 3 business days for your refill to be completed.          Additional Information About Your Visit        MyChart Information     GTxcel is an electronic gateway that provides easy, online access to your medical records. With GTxcel, you can request a clinic appointment, read your test results, renew a prescription or communicate with your care team.     To sign up for GTxcel visit the website at [email or web address]   You will be asked to enter the access code listed below, as well as some personal information. Please follow the directions to create your username and password.     Your access code is: [email or web address]  Expires: 2018  3:28 PM     Your access code will  in 90 days. If you need help or a new code,  please contact your [facility] or call [phone] for assistance.        Care EveryWhere ID     This is your Care EveryWhere ID. This could be used by other organizations to access your [facility] medical records  [id number]        Your Vitals Were     Pulse Pulse Oximetry BMI (Body Mass Index)             59 100% 22.63 kg/m2          Blood Pressure from Last 3 Encounters:   08/30/18 110/66   08/03/18 124/64   07/26/18 132/70    Weight from Last 3 Encounters:   08/30/18 61.7 kg (136 lb)   08/03/18 62.6 kg (138 lb)   07/26/18 63.7 kg (140 lb 6.4 oz)              Today, you had the following     No orders found for display         Today's Medication Changes          These changes are accurate as of 8/30/18  3:28 PM.  If you have any questions, ask your nurse or doctor.               These medicines have changed or have updated prescriptions.        Dose/Directions    * donepezil 5 MG tablet   Commonly known as:  ARIcept   This may have changed:  Another medication with the same name was added. Make sure you understand how and when to take each.   Used for:  Late onset Alzheimer's disease without behavioral disturbance   Changed by:  Carrillo Merchant MD        Dose:  5 mg   Take 1 tablet (5 mg) by mouth At Bedtime   Quantity:  30 tablet   Refills:  1       * donepezil 10 MG tablet   Commonly known as:  ARICEPT   This may have changed:  You were already taking a medication with the same name, and this prescription was added. Make sure you understand how and when to take each.   Used for:  Late onset Alzheimer's disease without behavioral disturbance   Changed by:  Carrillo Merchant MD        Dose:  10 mg   Take 1 tablet (10 mg) by mouth At Bedtime   Quantity:  90 tablet   Refills:  1       * escitalopram 10 MG tablet   Commonly known as:  LEXAPRO   This may have changed:  Another medication with the same name was added. Make sure you understand how and when to take each.   Used for:   Depressive disorder, not elsewhere classified   Changed by:  Carrillo Merchant MD        Dose:  10 mg   Take 1 tablet by mouth daily.   Quantity:  90 tablet   Refills:  1       * escitalopram 10 MG tablet   Commonly known as:  LEXAPRO   This may have changed:  You were already taking a medication with the same name, and this prescription was added. Make sure you understand how and when to take each.   Used for:  Late onset Alzheimer's disease without behavioral disturbance, Anxiety   Changed by:  Carrillo Merchant MD        Dose:  10 mg   Take 1 tablet (10 mg) by mouth daily   Quantity:  90 tablet   Refills:  1       * Notice:  This list has 4 medication(s) that are the same as other medications prescribed for you. Read the directions carefully, and ask your doctor or other care provider to review them with you.         Where to get your medicines      Some of these will need a paper prescription and others can be bought over the counter.  Ask your nurse if you have questions.     Bring a paper prescription for each of these medications     donepezil 10 MG tablet    escitalopram 10 MG tablet                Primary Care Provider Office Phone # Fax #    Gauri Slade Bolton [phone] [address]  [place] [address]        Equal Access to Services     [facility] : Hadii cate Reyes, waaxda lusameer, qaybta kaalmada shahid, jeffery del cid. So [facility] [phone].    ATENCIÓN: Si habla español, tiene a vides disposición servicios gratuitos de asistencia lingüística. Karl al [phone].    We comply with applicable federal civil rights laws and [place] laws. We do not discriminate on the basis of race, color, national origin, age, disability, sex, sexual orientation, or gender identity.            Thank you!     Thank you for choosing [facility]  for your care. Our goal is always to provide you with excellent care. Hearing back  from our patients is one way we can continue to improve our services. Please take a few minutes to complete the written survey that you may receive in the mail after your visit with us. Thank you!             Your Updated Medication List - Protect others around you: Learn how to safely use, store and throw away your medicines at www.disposemymeds.org.          This list is accurate as of 8/30/18  3:28 PM.  Always use your most recent med list.                   Brand Name Dispense Instructions for use Diagnosis    ASPIRIN PO      Take 81 mg by mouth daily        CENTRUM SILVER per tablet      Take 1 tablet by mouth        CITRACAL + D PO      Take  by mouth.        CLARITIN 10 MG tablet   Generic drug:  loratadine      Take 10 mg by mouth daily.        clotrimazole 1 % cream    LOTRIMIN    15 g    Apply topically 2 times daily    Intertrigo       * donepezil 5 MG tablet    ARIcept    30 tablet    Take 1 tablet (5 mg) by mouth At Bedtime    Late onset Alzheimer's disease without behavioral disturbance       * donepezil 10 MG tablet    ARICEPT    90 tablet    Take 1 tablet (10 mg) by mouth At Bedtime    Late onset Alzheimer's disease without behavioral disturbance       * escitalopram 10 MG tablet    LEXAPRO    90 tablet    Take 1 tablet by mouth daily.    Depressive disorder, not elsewhere classified       * escitalopram 10 MG tablet    LEXAPRO    90 tablet    Take 1 tablet (10 mg) by mouth daily    Late onset Alzheimer's disease without behavioral disturbance, Anxiety       fenofibrate 160 MG tablet           hydrochlorothiazide 25 MG tablet    HYDRODIURIL    90 tablet    Take 1 tablet (25 mg) by mouth daily    Essential hypertension       lisinopril 10 MG tablet    PRINIVIL/ZESTRIL    90 tablet    Take 1 tablet (10 mg) by mouth daily    Essential hypertension       meclizine 25 MG tablet    ANTIVERT     Take 25 mg by mouth 2 times daily.        metoprolol tartrate 50 MG tablet    LOPRESSOR    60 tablet    Take 1  tablet by mouth 2 times daily.    Essential hypertension       niacin 100 MG tablet      Take 100 mg by mouth 2 times daily.        nystatin 910074 UNIT/GM Powd    NYSTOP    2 Bottle    Apply 15 g topically 2 times daily    Intertrigo       oxazepam 10 MG capsule    SERAX     Take 10 mg by mouth nightly as needed.        simvastatin 20 MG tablet    ZOCOR    90 tablet    Take 1 tablet by mouth At Bedtime.    Hyperlipidemia       SLOW  (50 Fe) MG tablet   Generic drug:  ferrous sulfate Dried      Take 1 tablet by mouth daily (with breakfast).        vitamin D3 1000 units Caps      Take 2,000 Units by mouth        * Notice:  This list has 4 medication(s) that are the same as other medications prescribed for you. Read the directions carefully, and ask your doctor or other care provider to review them with you.

## 2018-08-31 NOTE — PROGRESS NOTES
Visit Date:   2018      HISTORY OF PRESENT ILLNESS:  Huber Moscoso returns for followup.  She is accompanied by her daughter, Jillian, and her son-in-law, Edward.  She is a patient with Alzheimer disease.  She did start on donepezil a month ago at the 5 mg dose.  She has tolerated it well.  It has not had any significant impact on her memory and cognition at this point.      Since I saw her a month ago, she has moved into the [facility].  This is assisted living with the ability to transition to memory care.  While it was a bit difficult at first, she is starting to settle in according to her daughter and son-in-law.      She has not had any attacks of vertigo related to Meniere disease and is not taking oxazepam or meclizine.      I discussed pushing the donepezil dose up to 10 mg and again reviewed side effects.  Everyone was in agreement in doing so.  She had been on Lexapro 10 mg for anxiety, but this was not restarted yet.  I think that is reasonable treatment for her of anxiety  and so I did write a new prescription in order to resume Lexapro 10 mg.      She does not need to be on any regular vestibular suppressant medications, but if she does have another attack of vertigo, meclizine 25 mg once or twice a day can be utilized.  I will be seeing her back in 2 months.         GAGE ARORA MD             D: 2018   T: 2018   MT: CLAUDY      Name:     HUBER MOSCOSO   MRN:      [id number]        Account:      [id number]   :      1945           Visit Date:   2018      Document: [id number]

## 2018-09-18 ENCOUNTER — TELEPHONE (OUTPATIENT)
Dept: FAMILY MEDICINE | Facility: CLINIC | Age: 73
End: 2018-09-18

## 2018-09-18 NOTE — TELEPHONE ENCOUNTER
Reason for call:  Other   Patient called regarding (reason for call): Orders   Additional comments: They need a order for a flu shot     Phone number to reach patient:  [phone] and fax number [phone]    Best Time:  Christine     Can we leave a detailed message on this number?  YES

## 2018-09-19 ENCOUNTER — TELEPHONE (OUTPATIENT)
Dept: NEUROLOGY | Facility: CLINIC | Age: 73
End: 2018-09-19

## 2018-09-19 NOTE — TELEPHONE ENCOUNTER
Health Call Center    Phone Message    May a detailed message be left on voicemail: yes    Reason for Call: Leonela the nurse from [facility] is requesting an order to be faxed today from Dr. Merchant so Ashli can get a flu shot 1st thing tomorrow morning.    Fax# [phone]     Action Taken: Message routed to:  [facility] neurology clinic

## 2018-09-20 NOTE — TELEPHONE ENCOUNTER
Called patient's living facility and spoke with Danuta. Explained that this order should come from patient's PMD. Danuta verbalized understanding.

## 2018-10-01 ENCOUNTER — TELEPHONE (OUTPATIENT)
Dept: NEUROLOGY | Facility: CLINIC | Age: 73
End: 2018-10-01

## 2018-10-01 NOTE — TELEPHONE ENCOUNTER
"[facility] Call Center    Phone Message    May a detailed message be left on voicemail: yes    Reason for Call: Medication Refill Request  Oxazepam 10 mg caps. To [facility] [phone] or [phone] option 1 to speak with pharmacy.    Calling from [facility] lilian Sepncer. [phone].  I spoke with Leonela, mentioned that last Rx was \"reported\" not written by Dr. Merchant.  She reports Ashli has been at their facility for some time but they don't have another physician on file.  I informed her I would pass request on.    Also gave her PCP per EMR Dr. Bolton and advised her to check with them also.        Action Taken: Message routed to [facility]  "

## 2018-10-03 NOTE — TELEPHONE ENCOUNTER
Called the [facility] l[place] and communicated Dr. Merchant's message. Ashley Edge RN

## 2018-10-03 NOTE — TELEPHONE ENCOUNTER
This was prescribed Melinda an ENT specialist. It was stopped because of her memory issues. When I last saw her she was not expereriencing vertigo so not reordered. If she is having vertigo again they should check with Dr Lawrence about restarting. Carrillo Merchant

## 2018-10-24 ENCOUNTER — OFFICE VISIT (OUTPATIENT)
Dept: NEUROLOGY | Facility: CLINIC | Age: 73
End: 2018-10-24
Payer: COMMERCIAL

## 2018-10-24 VITALS
BODY MASS INDEX: 23.95 KG/M2 | RESPIRATION RATE: 16 BRPM | DIASTOLIC BLOOD PRESSURE: 70 MMHG | HEART RATE: 89 BPM | SYSTOLIC BLOOD PRESSURE: 125 MMHG | WEIGHT: 143.9 LBS | TEMPERATURE: 98.6 F | OXYGEN SATURATION: 100 %

## 2018-10-24 DIAGNOSIS — F02.80 LATE ONSET ALZHEIMER'S DISEASE WITHOUT BEHAVIORAL DISTURBANCE (H): Primary | ICD-10-CM

## 2018-10-24 DIAGNOSIS — G30.1 LATE ONSET ALZHEIMER'S DISEASE WITHOUT BEHAVIORAL DISTURBANCE (H): Primary | ICD-10-CM

## 2018-10-24 PROCEDURE — 99213 OFFICE O/P EST LOW 20 MIN: CPT | Performed by: PSYCHIATRY & NEUROLOGY

## 2018-10-24 ASSESSMENT — PAIN SCALES - GENERAL: PAINLEVEL: NO PAIN (0)

## 2018-10-24 NOTE — LETTER
10/24/2018         RE: Ashli Moscoso  [address] [facility]  [place] [address]        Dear Colleague,    Thank you for referring your patient, Ashli Moscoso, to the [facility]. Please see a copy of my visit note below.    Visit Date:   10/24/2018      HISTORY OF PRESENT ILLNESS:  Ashli Moscoso returns for followup of Alzheimer disease.  She is accompanied by her daughter, Jillian.      Since I saw her, she did increase her donepezil dose up to 10 mg.  She is tolerating this well.  Jillian feels it has led to some improvement.      She continues on Lexapro 10 mg for anxiety.      She does have a history of Meniere disease but has not had any further attacks of vertigo.  She is no longer on oxazepam, but does have meclizine ordered as needed.      She currently resides at the [facility], which is a memory care facility.  Her  does visit her frequently.      Examination reveals she is alert.  She tends to have to have questions and  comments repeated to her several times.  She gets mixed up on what medications and what health problem we are talking about.  She does know she is in [place], but has trouble with the city.  She has moved around a bit.  Initially she thought she was in [place] and then [place].  She currently resides in [place].  She does know it is October and it is fall, but she does not know the day of the week and she gives the year as 2017.  She could spell the word world forward and backward accurately.  Her spontaneous speech is without error and she can name and repeat.  She did recall 2/3 objects.      PHYSICAL EXAMINATION:   VITAL SIGNS:  Heart rate is 89.  Blood pressure 125/70.      IMPRESSION:  Alzheimer disease.      PLAN:  She is going to continue with her current treatment, which includes donepezil 10 mg and Lexapro 10 mg.      I plan to see her back in 6 months.         GAGE ARORA MD             D: 10/24/2018   T: 10/24/2018    MT: RENE      Name:     HUBER MCDANIELS   MRN:      [id number]        Account:      [id number]   :      1945           Visit Date:   10/24/2018      Document: [id number]        Again, thank you for allowing me to participate in the care of your patient.        Sincerely,        Carrillo Merchant MD

## 2018-10-24 NOTE — PROGRESS NOTES
Visit Date:   10/24/2018      HISTORY OF PRESENT ILLNESS:  Huber Moscoso returns for followup of Alzheimer disease.  She is accompanied by her daughter, Jillian.      Since I saw her, she did increase her donepezil dose up to 10 mg.  She is tolerating this well.  Jillian feels it has led to some improvement.      She continues on Lexapro 10 mg for anxiety.      She does have a history of Meniere disease but has not had any further attacks of vertigo.  She is no longer on oxazepam, but does have meclizine ordered as needed.      She currently resides at the [facility], which is a memory care facility.  Her  does visit her frequently.      Examination reveals she is alert.  She tends to have to have questions and  comments repeated to her several times.  She gets mixed up on what medications and what health problem we are talking about.  She does know she is in [place], but has trouble with the city.  She has moved around a bit.  Initially she thought she was in [place] and then [place].  She currently resides in [place].  She does know it is October and it is fall, but she does not know the day of the week and she gives the year as .  She could spell the word world forward and backward accurately.  Her spontaneous speech is without error and she can name and repeat.  She did recall 2/3 objects.      PHYSICAL EXAMINATION:   VITAL SIGNS:  Heart rate is 89.  Blood pressure 125/70.      IMPRESSION:  Alzheimer disease.      PLAN:  She is going to continue with her current treatment, which includes donepezil 10 mg and Lexapro 10 mg.      I plan to see her back in 6 months.         GAGE ARORA MD             D: 10/24/2018   T: 10/24/2018   MT: RENE      Name:     HUBER MOSCOSO   MRN:      [id number]        Account:      [id number]   :      1945           Visit Date:   10/24/2018      Document: [id number]

## 2018-10-24 NOTE — MR AVS SNAPSHOT
After Visit Summary   10/24/2018    Ashli Moscoso    MRN: [id number]           Patient Information     Date Of Birth          1945        Visit Information        Provider Department      10/24/2018 3:00 PM Carrillo Merchant MD [facility]        Today's Diagnoses     Late onset Alzheimer's disease without behavioral disturbance    -  1      Care Instructions    6 month recheck 4/24/19 @ 9:30AM with Dr. Merchant          Follow-ups after your visit        Follow-up notes from your care team     Discussed this visit Return in about 6 months (around 4/24/2019).      Your next 10 appointments already scheduled     Apr 24, 2019  9:30 AM CDT   Return Visit with Carrillo Merchant MD   [facility] ([facility])    [address] [place] [id number] [phone]              Who to contact     If you have questions or need follow up information about today's clinic visit or your schedule please contact [facility] directly at [phone].  Normal or non-critical lab and imaging results will be communicated to you by MyChart, letter or phone within 4 business days after the clinic has received the results. If you do not hear from us within 7 days, please contact the clinic through MyChart or phone. If you have a critical or abnormal lab result, we will notify you by phone as soon as possible.  Submit refill requests through Vyatta or call your pharmacy and they will forward the refill request to us. Please allow 3 business days for your refill to be completed.          Additional Information About Your Visit        In[employer] Information     Vyatta is an electronic gateway that provides easy, online access to your medical records. With Vyatta, you can request a clinic appointment, read your test results, renew a prescription or communicate with your care team.     To sign up for Vyatta visit the website at  [email or web address]   You will be asked to enter the access code listed below, as well as some personal information. Please follow the directions to create your username and password.     Your access code is: [email or web address]  Expires: 2018  3:28 PM     Your access code will  in 90 days. If you need help or a new code, please contact your [facility] or call [phone] for assistance.        Care EveryWhere ID     This is your Care EveryWhere ID. This could be used by other organizations to access your [place] medical records  [id number]        Your Vitals Were     Pulse Temperature Respirations Pulse Oximetry BMI (Body Mass Index)       89 98.6  F (37  C) (Oral) 16 100% 23.95 kg/m2        Blood Pressure from Last 3 Encounters:   10/24/18 125/70   18 110/66   18 124/64    Weight from Last 3 Encounters:   10/24/18 65.3 kg (143 lb 14.4 oz)   18 61.7 kg (136 lb)   18 62.6 kg (138 lb)              Today, you had the following     No orders found for display       Primary Care Provider Office Phone # Fax #    Gauri Slade Bolton [phone] [address]  [facility] [address]        Equal Access to Services     [facility]: Hadii cate kelley hadasho Sogamal, waaxda luqadaha, qaybta kaalmada shahid, jeffery hall . So [facility] [phone].    ATENCIÓN: Si habla español, tiene a vides disposición servicios gratuitos de asistencia lingüística. Llame al [phone].    We comply with applicable federal civil rights laws and [place] laws. We do not discriminate on the basis of race, color, national origin, age, disability, sex, sexual orientation, or gender identity.            Thank you!     Thank you for choosing [facility]  for your care. Our goal is always to provide you with excellent care. Hearing back from our patients is one way we can continue to improve our services. Please  take a few minutes to complete the written survey that you may receive in the mail after your visit with us. Thank you!             Your Updated Medication List - Protect others around you: Learn how to safely use, store and throw away your medicines at www.disposemymeds.org.          This list is accurate as of 10/24/18  3:30 PM.  Always use your most recent med list.                   Brand Name Dispense Instructions for use Diagnosis    ASPIRIN PO      Take 81 mg by mouth daily        CENTRUM SILVER per tablet      Take 1 tablet by mouth        CITRACAL + D PO      Take  by mouth.        CLARITIN 10 MG tablet   Generic drug:  loratadine      Take 10 mg by mouth daily.        clotrimazole 1 % cream    LOTRIMIN    15 g    Apply topically 2 times daily    Intertrigo       donepezil 10 MG tablet    ARICEPT    90 tablet    Take 1 tablet (10 mg) by mouth At Bedtime    Late onset Alzheimer's disease without behavioral disturbance       escitalopram 10 MG tablet    LEXAPRO    90 tablet    Take 1 tablet (10 mg) by mouth daily    Late onset Alzheimer's disease without behavioral disturbance, Anxiety       fenofibrate 160 MG tablet           hydrochlorothiazide 25 MG tablet    HYDRODIURIL    90 tablet    Take 1 tablet (25 mg) by mouth daily    Essential hypertension       lisinopril 10 MG tablet    PRINIVIL/ZESTRIL    90 tablet    Take 1 tablet (10 mg) by mouth daily    Essential hypertension       meclizine 25 MG tablet    ANTIVERT     Take 25 mg by mouth 2 times daily PRN        metoprolol tartrate 50 MG tablet    LOPRESSOR    60 tablet    Take 1 tablet by mouth 2 times daily.    Essential hypertension       niacin 100 MG tablet      Take 100 mg by mouth 2 times daily.        nystatin 801263 UNIT/GM Powd    NYSTOP    2 Bottle    Apply 15 g topically 2 times daily    Intertrigo       simvastatin 20 MG tablet    ZOCOR    90 tablet    Take 1 tablet by mouth At Bedtime.    Hyperlipidemia       SLOW  (50 Fe) MG tablet    Generic drug:  ferrous sulfate Dried      Take 1 tablet by mouth daily (with breakfast).

## 2018-11-16 ENCOUNTER — TELEPHONE (OUTPATIENT)
Dept: NEUROLOGY | Facility: CLINIC | Age: 73
End: 2018-11-16

## 2018-11-16 NOTE — TELEPHONE ENCOUNTER
[facility] Call Center    Phone Message    May a detailed message be left on voicemail: yes    Reason for Call: Idie called and said patient is at [facility] in [place].  Requesting an order to be faxed authorizing Extra Strength Tylenol or Ibuprofen PRN.  Please advise.  Thank you.                  Please fax to : [facility]: Attn: [facility]  [phone]    Action Taken: Message routed to:  Adult Clinics: Neurology p [phone]

## 2018-11-16 NOTE — TELEPHONE ENCOUNTER
Faxed over notification to [facility] that these medication requests need to be directed to PCP. Ashley Edge RN

## 2019-03-11 ENCOUNTER — HOSPITAL ENCOUNTER (INPATIENT)
Facility: CLINIC | Age: 74
LOS: 2 days | Discharge: HOME HEALTH | DRG: 087 | End: 2019-03-13
Attending: EMERGENCY MEDICINE | Admitting: HOSPITALIST
Payer: COMMERCIAL

## 2019-03-11 ENCOUNTER — APPOINTMENT (OUTPATIENT)
Dept: CT IMAGING | Facility: CLINIC | Age: 74
DRG: 087 | End: 2019-03-11
Attending: PHYSICIAN ASSISTANT
Payer: COMMERCIAL

## 2019-03-11 ENCOUNTER — APPOINTMENT (OUTPATIENT)
Dept: GENERAL RADIOLOGY | Facility: CLINIC | Age: 74
DRG: 087 | End: 2019-03-11
Attending: PHYSICIAN ASSISTANT
Payer: COMMERCIAL

## 2019-03-11 DIAGNOSIS — I60.9 SUBARACHNOID HEMORRHAGE (H): Primary | ICD-10-CM

## 2019-03-11 DIAGNOSIS — M25.562 ACUTE PAIN OF LEFT KNEE: ICD-10-CM

## 2019-03-11 DIAGNOSIS — Z76.89 HEALTH CARE HOME: ICD-10-CM

## 2019-03-11 DIAGNOSIS — I60.9 SAH (SUBARACHNOID HEMORRHAGE) (H): ICD-10-CM

## 2019-03-11 DIAGNOSIS — S09.90XA CLOSED HEAD INJURY, INITIAL ENCOUNTER: ICD-10-CM

## 2019-03-11 DIAGNOSIS — S00.03XA HEMATOMA OF SCALP, INITIAL ENCOUNTER: ICD-10-CM

## 2019-03-11 LAB
ABO + RH BLD: NORMAL
ABO + RH BLD: NORMAL
ANION GAP SERPL CALCULATED.3IONS-SCNC: 5 MMOL/L (ref 3–14)
BASOPHILS # BLD AUTO: 0.1 10E9/L (ref 0–0.2)
BASOPHILS NFR BLD AUTO: 0.5 %
BLD GP AB SCN SERPL QL: NORMAL
BLOOD BANK CMNT PATIENT-IMP: NORMAL
BUN SERPL-MCNC: 16 MG/DL (ref 7–30)
CALCIUM SERPL-MCNC: 9.4 MG/DL (ref 8.5–10.1)
CHLORIDE SERPL-SCNC: 103 MMOL/L (ref 94–109)
CO2 SERPL-SCNC: 30 MMOL/L (ref 20–32)
CREAT SERPL-MCNC: 0.78 MG/DL (ref 0.52–1.04)
DIFFERENTIAL METHOD BLD: NORMAL
EOSINOPHIL # BLD AUTO: 0.2 10E9/L (ref 0–0.7)
EOSINOPHIL NFR BLD AUTO: 2.4 %
ERYTHROCYTE [DISTWIDTH] IN BLOOD BY AUTOMATED COUNT: 12.8 % (ref 10–15)
GFR SERPL CREATININE-BSD FRML MDRD: 76 ML/MIN/{1.73_M2}
GLUCOSE BLDC GLUCOMTR-MCNC: 106 MG/DL (ref 70–99)
GLUCOSE BLDC GLUCOMTR-MCNC: 114 MG/DL (ref 70–99)
GLUCOSE SERPL-MCNC: 113 MG/DL (ref 70–99)
HCT VFR BLD AUTO: 36.4 % (ref 35–47)
HGB BLD-MCNC: 12.1 G/DL (ref 11.7–15.7)
IMM GRANULOCYTES # BLD: 0 10E9/L (ref 0–0.4)
IMM GRANULOCYTES NFR BLD: 0.3 %
INR PPP: 0.98 (ref 0.86–1.14)
LYMPHOCYTES # BLD AUTO: 1.5 10E9/L (ref 0.8–5.3)
LYMPHOCYTES NFR BLD AUTO: 15.6 %
MCH RBC QN AUTO: 30.6 PG (ref 26.5–33)
MCHC RBC AUTO-ENTMCNC: 33.2 G/DL (ref 31.5–36.5)
MCV RBC AUTO: 92 FL (ref 78–100)
MONOCYTES # BLD AUTO: 0.7 10E9/L (ref 0–1.3)
MONOCYTES NFR BLD AUTO: 6.9 %
NEUTROPHILS # BLD AUTO: 7.2 10E9/L (ref 1.6–8.3)
NEUTROPHILS NFR BLD AUTO: 74.3 %
NRBC # BLD AUTO: 0 10*3/UL
NRBC BLD AUTO-RTO: 0 /100
PLATELET # BLD AUTO: 304 10E9/L (ref 150–450)
POTASSIUM SERPL-SCNC: 3.2 MMOL/L (ref 3.4–5.3)
RADIOLOGIST FLAGS: ABNORMAL
RBC # BLD AUTO: 3.95 10E12/L (ref 3.8–5.2)
SODIUM SERPL-SCNC: 138 MMOL/L (ref 133–144)
SPECIMEN EXP DATE BLD: NORMAL
WBC # BLD AUTO: 9.7 10E9/L (ref 4–11)

## 2019-03-11 PROCEDURE — 00000146 ZZHCL STATISTIC GLUCOSE BY METER IP

## 2019-03-11 PROCEDURE — 85610 PROTHROMBIN TIME: CPT | Performed by: EMERGENCY MEDICINE

## 2019-03-11 PROCEDURE — 25000132 ZZH RX MED GY IP 250 OP 250 PS 637: Performed by: PHYSICIAN ASSISTANT

## 2019-03-11 PROCEDURE — 99223 1ST HOSP IP/OBS HIGH 75: CPT | Mod: AI | Performed by: HOSPITALIST

## 2019-03-11 PROCEDURE — A9270 NON-COVERED ITEM OR SERVICE: HCPCS | Mod: GY | Performed by: PHYSICIAN ASSISTANT

## 2019-03-11 PROCEDURE — 86900 BLOOD TYPING SEROLOGIC ABO: CPT | Performed by: EMERGENCY MEDICINE

## 2019-03-11 PROCEDURE — 80048 BASIC METABOLIC PNL TOTAL CA: CPT | Performed by: EMERGENCY MEDICINE

## 2019-03-11 PROCEDURE — 20000003 ZZH R&B ICU

## 2019-03-11 PROCEDURE — 85025 COMPLETE CBC W/AUTO DIFF WBC: CPT | Performed by: EMERGENCY MEDICINE

## 2019-03-11 PROCEDURE — 86850 RBC ANTIBODY SCREEN: CPT | Performed by: EMERGENCY MEDICINE

## 2019-03-11 PROCEDURE — 73562 X-RAY EXAM OF KNEE 3: CPT | Mod: LT

## 2019-03-11 PROCEDURE — 70450 CT HEAD/BRAIN W/O DYE: CPT

## 2019-03-11 PROCEDURE — 86901 BLOOD TYPING SEROLOGIC RH(D): CPT | Performed by: EMERGENCY MEDICINE

## 2019-03-11 PROCEDURE — 99285 EMERGENCY DEPT VISIT HI MDM: CPT | Mod: 25

## 2019-03-11 RX ORDER — PROCHLORPERAZINE MALEATE 5 MG
5 TABLET ORAL EVERY 6 HOURS PRN
Status: DISCONTINUED | OUTPATIENT
Start: 2019-03-11 | End: 2019-03-11

## 2019-03-11 RX ORDER — METOCLOPRAMIDE 5 MG/1
5 TABLET ORAL EVERY 6 HOURS PRN
Status: DISCONTINUED | OUTPATIENT
Start: 2019-03-11 | End: 2019-03-11

## 2019-03-11 RX ORDER — LABETALOL 20 MG/4 ML (5 MG/ML) INTRAVENOUS SYRINGE
10-20 EVERY 10 MIN PRN
Status: DISCONTINUED | OUTPATIENT
Start: 2019-03-11 | End: 2019-03-13 | Stop reason: HOSPADM

## 2019-03-11 RX ORDER — ONDANSETRON 2 MG/ML
4 INJECTION INTRAMUSCULAR; INTRAVENOUS EVERY 6 HOURS PRN
Status: DISCONTINUED | OUTPATIENT
Start: 2019-03-11 | End: 2019-03-13 | Stop reason: HOSPADM

## 2019-03-11 RX ORDER — METOCLOPRAMIDE HYDROCHLORIDE 5 MG/ML
5 INJECTION INTRAMUSCULAR; INTRAVENOUS EVERY 6 HOURS PRN
Status: DISCONTINUED | OUTPATIENT
Start: 2019-03-11 | End: 2019-03-11

## 2019-03-11 RX ORDER — NYSTATIN 100000 [USP'U]/G
POWDER TOPICAL 2 TIMES DAILY PRN
COMMUNITY

## 2019-03-11 RX ORDER — ACETAMINOPHEN 325 MG/1
650 TABLET ORAL EVERY 6 HOURS PRN
COMMUNITY

## 2019-03-11 RX ORDER — HYDRALAZINE HYDROCHLORIDE 20 MG/ML
10-20 INJECTION INTRAMUSCULAR; INTRAVENOUS
Status: DISCONTINUED | OUTPATIENT
Start: 2019-03-11 | End: 2019-03-13 | Stop reason: HOSPADM

## 2019-03-11 RX ORDER — ONDANSETRON 4 MG/1
4 TABLET, ORALLY DISINTEGRATING ORAL EVERY 6 HOURS PRN
Status: DISCONTINUED | OUTPATIENT
Start: 2019-03-11 | End: 2019-03-13 | Stop reason: HOSPADM

## 2019-03-11 RX ORDER — PROCHLORPERAZINE 25 MG
12.5 SUPPOSITORY, RECTAL RECTAL EVERY 12 HOURS PRN
Status: DISCONTINUED | OUTPATIENT
Start: 2019-03-11 | End: 2019-03-11

## 2019-03-11 RX ORDER — ACETAMINOPHEN 325 MG/1
975 TABLET ORAL ONCE
Status: COMPLETED | OUTPATIENT
Start: 2019-03-11 | End: 2019-03-11

## 2019-03-11 RX ORDER — ASPIRIN 81 MG/1
81 TABLET, CHEWABLE ORAL DAILY
Status: ON HOLD | COMMUNITY
End: 2019-03-13

## 2019-03-11 RX ORDER — NALOXONE HYDROCHLORIDE 0.4 MG/ML
.1-.4 INJECTION, SOLUTION INTRAMUSCULAR; INTRAVENOUS; SUBCUTANEOUS
Status: DISCONTINUED | OUTPATIENT
Start: 2019-03-11 | End: 2019-03-13 | Stop reason: HOSPADM

## 2019-03-11 RX ORDER — METOPROLOL TARTRATE 25 MG/1
25 TABLET, FILM COATED ORAL 2 TIMES DAILY
COMMUNITY

## 2019-03-11 RX ADMIN — ACETAMINOPHEN 975 MG: 325 TABLET, FILM COATED ORAL at 15:29

## 2019-03-11 ASSESSMENT — ENCOUNTER SYMPTOMS
CONFUSION: 0
WOUND: 1
ABDOMINAL PAIN: 0
HEADACHES: 0
SHORTNESS OF BREATH: 0
NECK PAIN: 0

## 2019-03-11 ASSESSMENT — ACTIVITIES OF DAILY LIVING (ADL)
RETIRED_COMMUNICATION: 0-->UNDERSTANDS/COMMUNICATES WITHOUT DIFFICULTY
TRANSFERRING: 0-->INDEPENDENT
DRESS: 0-->INDEPENDENT
TOILETING: 1-->ASSISTIVE EQUIPMENT
FALL_HISTORY_WITHIN_LAST_SIX_MONTHS: NO
AMBULATION: 0-->INDEPENDENT
RETIRED_EATING: 0-->INDEPENDENT
COGNITION: 1 - ATTENTION OR MEMORY DEFICITS
ADLS_ACUITY_SCORE: 14
WHICH_OF_THE_ABOVE_FUNCTIONAL_RISKS_HAD_A_RECENT_ONSET_OR_CHANGE?: FALL HISTORY
SWALLOWING: 0-->SWALLOWS FOODS/LIQUIDS WITHOUT DIFFICULTY
BATHING: 1-->ASSISTIVE EQUIPMENT

## 2019-03-11 ASSESSMENT — MIFFLIN-ST. JEOR: SCORE: 1196.28

## 2019-03-11 NOTE — ED PROVIDER NOTES
History     Chief Complaint:  Fall    HPI   Ashli Moscoso is a 73 year old female who presents with left forehead and left knee injury after falling at lunch. One hour prior to arrival, she was walking out of the restaurant, was tripped by the carpet and fell forward, hitting her head on the carpet rony. Patient did not take any Tylenol or ibuprofen. Patient is not anticoagulated. She has a history of Alzheimer's and Meniere's disease and resides at a memory care facility. Patient denies headache, neck pain, loss of consciousness, dizziness, increased confusion from baseline, chest pain, shortness of breath and abdominal pain.  She not have any chest pain, shortness of breath, or other symptoms prior to fall.  She was able to ambulate after the event.    Allergies:  Azithromycin  Bacitracin   Ciprofloxacin   Codeine  Diazepam  Epinephrine  Latex  Moxifloxacin   Prochlorperazine  Rofecoxib   Sulfa     Medications:    Aricept  Aspirin   Citracal   Clotrimazole  Hydrochlorothiazide  Lexapro  Lisinopril   Loratadine  Meclizine  Metoprolol   Zocor     Past Medical History:    Alzhemier's disease  HLD  HTN  Meniere's disease    Past Surgical History:    Repair femoral hernia    Family History:    Dementia   Heart disease    Social History:  Presents to the ED with family.   Tobacco Use: Never  Alcohol Use: No  PCP: Gauri Bolton  Marital Status:   [2]     Review of Systems   Respiratory: Negative for shortness of breath.    Cardiovascular: Negative for chest pain.   Gastrointestinal: Negative for abdominal pain.   Musculoskeletal: Negative for neck pain.        Positive for left knee pain.    Skin: Positive for wound.   Neurological: Negative for syncope and headaches.   Psychiatric/Behavioral: Negative for confusion.   All other systems reviewed and are negative.    Physical Exam   First Vitals:  Patient Vitals for the past 24 hrs:   BP Temp Heart Rate Resp SpO2 Height Weight   03/11/19 1505 181/73 97.8  " F (36.6  C) 70 16 99 % 1.651 m (5' 5\") 68 kg (150 lb)     Physical Exam  General: Well appearing, well nourished. Resting comfortably. Mildly forgetful due to Alzheimer's.  Skin: No ecchymosis or contusions, abrasions, lacerations.   HEENT: Head: Normocephalic, no visible or palpable masses. No raccoon eyes or lee sign. Developing hematoma to left forehead. No open lacerations or abrasions.   Eyes: Conjunctiva clear, PERRL, EOMs intact.   Ears: EACs clear, TMs translucent.   Nose: No septal hematoma or signs of epistaxis.   Throat/pharynx: Mucous membranes moist, no mucosal lesions or lacerations.   Cardiac: Normal rate and regular rhythm, no murmur or gallop.   Lungs: Clear to auscultation.  Abdomen: Abdomen soft, non-tender. No rebound tenderness of guarding.   Musculoskeletal: No cervical, thoracic, lumbar spine tenderness to palpation. Full ROM of neck without pain. No tenderness and good strength throughout shoulders, upper extremities, hips, lower extremities. Normal gait and station. Left knee: Developing ecchymosis over left knee. Minor tenderness to palpation through left patella and proximal tibia.   No tenderness to palpation throughout distal femur/hamstrings, proximal tibia/fibula. No tenderness to palpation throughout bilateral joint lines, MCL, LCL. Full flexion and extension without difficulty or significant pain. Able to have full extension against resistance. Normal anterior and posterior drawer, and lachman's test. Normal Meron's. Normal valgus and varus.  Full range of motion of left hip and ankle without difficulty.  Full sensation throughout bilateral lower extremities.  Dorsalis pedis and posterior tibialis pulses intact bilaterally.  Normal capillary refill of all digits bilateral lower extremities.  Neurologic: Oriented x 3. GCS: 15. CN II-XII intact. Normal finger to nose and rapid hand movements. Normal sensation throughout upper and lower extremities.   Psychiatric: Intact recent " and remote memory, judgment and insight, normal mood and affect.     Emergency Department Course     Imaging:  Radiographic findings were communicated with the patient who voiced understanding of the findings.    CT Head w/o Contrast   Preliminary Result   Abnormal   IMPRESSION:  Small volume bifrontal subarachnoid hemorrhage. No   significant mass effect or hydrocephalus. Calcified lesion.      [Critical Result: Acute hemorrhage]      Finding was identified on 3/11/2019 4:38 PM.       Dr. Castle  was contacted by me on 3/11/2019 4:40 PM and   verbalized understanding of the critical result.       XR Knee Left 3 Views   Final Result   IMPRESSION: Prepatellar soft tissue swelling. No apparent fracture.   Degenerative arthrosis with mild to moderate lateral and mild   medial/patellofemoral compartment marginal osteophytic spurring. Mild   lateral compartment joint space narrowing is also suggested.      ALICE OLIVER MD          Laboratory:   CBC:  WBC 9.7, HGB 12.1, , otherwise WNL  BMP: Potassium 3.2 (L), glucose 113 (H), otherwise WNL (Creatinine 0.78)  ABO/Rh type and screen: Pending  INR: 0.98    Interventions:  1529: Tylenol, 975 mg, oral     Emergency Department Course:  The patient arrived in triage where vitals were measured and recorded.   The patient was then escorted back to the emergency department.   The patient's medical records were reviewed.  Nursing notes and vitals were reviewed.  1521: I performed an exam of the patient as documented above.  The above workup was undertaken.  1636: Called by radiology regarding patient's CT results.   1640: I rechecked the patient and discussed results. Patient was moved to stabilization room.   1650: I consulted with neurosurgery.  Findings and plan explained to the patient and family who consent to admission.   1708: I discussed the patient with Dr. Love of the hospitalist service, who will admit the patient to an ICU bed for further monitoring,  evaluation, and treatment.    Impression & Plan      Medical Decision Making:  Ashli Moscoso is a 73 year old female who presented to the emergency department for evaluation after fall with trauma to her head, and left knee pain.  Details of the patient's history can be noted in the HPI.  Upon my initial exam, patient was neurologically intact.  She appeared well.  There is developing hematoma to left scalp and contusion to the left knee.  Her head to toe trauma exam was otherwise negative for other serious injury or disease processes.  She did meet [facility] head CT rules.  Differential diagnosis includes skull fracture, epidural hematoma, subdural hematoma, intracerebral hemorrhage, traumatic subarachnoid hemorrhage, etc.  Imaging results returned showing evidence of small bifrontal subarachnoid hemorrhage.  With these results, I consulted with neurosurgery who recommended no initiation of Keppra, but blood pressure management with systolic less than 150.  After the imaging results, the patient was moved to our stabilization to room and the patient was staffed with Dr. Valdez.  Patient was set up for consistent blood pressure checks.  Her blood pressure did remain beneath 150 systolic.  No medications were initiated.  I then discussed the patient with Dr. Love who agreed to admit the patient in the hospital.  Patient will be placed in the ICU unit.  All of the findings as noted above are described in great detail with the patient and her family.  They were in agreement with the treatment plan.  Of note, patient's left knee x-ray was within normal limits. Patient was stable prior to admission.     Diagnosis:    ICD-10-CM    1. Subarachnoid hemorrhage (H) I60.9    2. Closed head injury, initial encounter S09.90XA CBC with platelets differential     Basic metabolic panel     ABO/Rh type and screen     INR   3. Hematoma of scalp, initial encounter S00.03XA    4. Acute pain of left knee M25.562         Disposition:  Admitted to an ICU bed.  I, Noa Saran, am serving as a scribe on 3/11/2019 at 3:21 PM to personally document services performed by Марина Castle PA-C, based on my observations and the provider's statements to me.    EMERGENCY DEPARTMENT    This was created at least in part with a voice recognition software. Mistakes/typos may be present.      Марина Castle PA  03/11/19 7926

## 2019-03-11 NOTE — PROGRESS NOTES
Paged by Марина RIZZO at [facility] ED. Patient slipped and fell on ice this afternoon.     Head CT:  IMPRESSION:  Small volume bifrontal subarachnoid hemorrhage. No  significant mass effect or hydrocephalus. Calcified lesion.    Per PA patient neuro intact and not on any blood thinners.     Recommendations  - Admission via hospitalist  -  Keep SBP < 150  -  Elevate HOB 30-60 degrees  -  Discontinue all blood thinners and anti-inflammatories  -  Follow up CT head in am and/or earlier if there is a mental status change or neurological change  - Full consult to follow    Keyona Moffett PA-C  [facility]  [address] [place] [address]  Tel [phone]  Fax [phone]

## 2019-03-11 NOTE — ED PROVIDER NOTES
Emergency Department Attending Supervision Note  3/11/2019  5:09 PM    I evaluated this patient in conjunction with Марина Castle    Briefly, the patient presented after a fall, hitting her head. She is not anticoagulated. She denies any headache, loss of consciousness, dizziness, neck pain.    After placement here in [facility], a bifrontal subarachnoid hemorrhage was discovered on CT. She has been subsequently sent to [facility] for BP control under my supervision.     On my exam:  General: Resting on the gurney, appears comfortable  Head:  The scalp, face, and head appear normal  Mouth/Throat: Mucus membranes are moist  CV:  Regular rate    Normal S1 and S2  No pathological murmur   Resp:  Breath sounds clear and equal bilaterally    Non-labored, no retractions or accessory muscle use    No coarseness    No wheezing   GI:  Abdomen is soft, no rigidity    No tenderness to palpation  MS:  Normal motor assessment of all extremities.    Good capillary refill noted. Knee tender to palpation. No deformity.  Skin:  No rash or lesions noted.  Neuro: Speech is normal and fluent. No apparent deficit. Cranial nerves 2-12 intact. Strength and sensation intact x4.   Psych:  Awake. Alert.  Normal affect.      Appropriate interactions.    Results:    CT SCAN OF THE HEAD WITHOUT CONTRAST   3/11/2019 4:34 PM   Small volume bifrontal subarachnoid hemorrhage. No  significant mass effect or hydrocephalus. Calcified lesion.  Reading per radiology    XR LEFT KNEE THREE VIEWS  3/11/2019 3:42 PM   Prepatellar soft tissue swelling. No apparent fracture.  Degenerative arthrosis with mild to moderate lateral and mild  medial/patellofemoral compartment marginal osteophytic spurring. Mild  lateral compartment joint space narrowing is also suggested.    ED course:    1645 Patient arrived in STAB 2.     1648 BP: 170/80    1650 Neurosurgery consult. Will not start Keppra. Will tightly monitor BP. Systolic goal below 150.     1656 BP:  148/78    Impression:  Ashli Moscoso is a 73 year old female who presents after hitting her head while falling down. Detailed neurologic exam shows no abnormalities. Imaging obtained and showed small volume bifrontal subarachnoid hemorrhage. The patient is not on blood thinners .  ***reversal used per protocol. Tight blood pressure control using nicardipine drip to SBP <150.  Neurosurgery  consulted.  Will admit to the hospitalist in the ICU.     Diagnosis    ICD-10-CM    1. Closed head injury, initial encounter S09.90XA CBC with platelets differential     Basic metabolic panel     ABO/Rh type and screen     INR   2. Hematoma of scalp, initial encounter S00.03XA    3. Acute pain of left knee M25.562      Simona Valdez MD

## 2019-03-11 NOTE — PHARMACY-ADMISSION MEDICATION HISTORY
Admission medication history interview status for the 3/11/2019  admission is complete. See EPIC admission navigator for prior to admission medications     Medication history source reliability:Good - MAR from facility    Actions taken by pharmacist (provider contacted, etc):None     Additional medication history information not noted on PTA med list :None    Medication reconciliation/reorder completed by provider prior to medication history? No    Time spent in this activity: 10 min    Prior to Admission medications    Medication Sig Last Dose Taking? Auth Provider   acetaminophen (TYLENOL) 325 MG tablet Take 650 mg by mouth every 6 hours as needed for mild pain prn Yes Unknown, Entered By History   aspirin (ASA) 81 MG chewable tablet Take 81 mg by mouth daily 3/11/2019 at am Yes Unknown, Entered By History   calcium citrate-vitamin D (CITRACAL) 315-250 MG-UNIT TABS per tablet Take 1 tablet by mouth daily 3/11/2019 at am Yes Unknown, Entered By History   clotrimazole (LOTRIMIN) 1 % cream Apply topically 2 times daily 3/11/2019 at am Yes Gauri Bolton MD   donepezil (ARICEPT) 10 MG tablet Take 1 tablet (10 mg) by mouth At Bedtime 3/10/2019 at pm Yes Carrillo Merchant MD   escitalopram (LEXAPRO) 10 MG tablet Take 1 tablet (10 mg) by mouth daily 3/11/2019 at am Yes Carrillo Merchant MD   fenofibrate 160 MG tablet Take 160 mg by mouth daily with food  3/11/2019 at am Yes Reported, Patient   Ferrous Sulfate Dried (SLOW FE) 160 (50 FE) MG tablet Take 1 tablet by mouth every other day  3/11/2019 at am Yes Reported, Patient   hydrochlorothiazide (HYDRODIURIL) 25 MG tablet Take 1 tablet (25 mg) by mouth daily 3/11/2019 at am Yes Gauri Bolton MD   lisinopril (PRINIVIL/ZESTRIL) 10 MG tablet Take 1 tablet (10 mg) by mouth daily 3/11/2019 at am Yes Gauri Bolton MD   loratadine (CLARITIN) 10 MG tablet Take 10 mg by mouth daily. 3/11/2019 at am Yes Reported, Patient   meclizine (ANTIVERT) 25 MG tablet Take  25-50 mg by mouth daily as needed for dizziness (vertigo) PRN prn Yes Reported, Patient   metoprolol tartrate (LOPRESSOR) 25 MG tablet Take 25 mg by mouth 2 times daily 3/11/2019 at am Yes Unknown, Entered By History   Multiple Vitamins-Minerals (CENTRUM SILVER) per tablet Take 1 tablet by mouth daily  3/11/2019 at am Yes Reported, Patient   niacin 100 MG tablet Take 100 mg by mouth daily  3/11/2019 at am Yes Reported, Patient   nystatin (MYCOSTATIN) 084468 UNIT/GM external powder Apply topically 2 times daily as needed prn Yes Unknown, Entered By History   simvastatin (ZOCOR) 20 MG tablet Take 1 tablet by mouth At Bedtime. 3/10/2019 at pm Yes Paul Lockett MD

## 2019-03-11 NOTE — H&P
[facility]    History and Physical - Hospitalist Service       Date of Admission:  3/11/2019    Assessment & Plan   Ashli Moscoso is a 73 year old female admitted on 3/11/2019 with fall and small bilateral SAH    Bilateral frontal SAH, hemorrhage  Patient is neurologically intact  Plan  - admit to the ICU  - q4 hour neuro checks  - blood pressure SBP < 150  - neurosurgery consultation  - avoid NSAIDS  - PT/OT  - fall precautions  - may need sitter  - prn tylenol  - aspiration precautions    Alzheimers dementia  - may end up needing sitter  - restart home meds when verified    Knee pain  - no fracture on the XR  - pt consult and tylenol PRN    HTN  - resume home meds when verified by pharmacy    HLD  - resume home meds    Meniere's disease  - no longer taking oxazepam     Diet: pending swallow evaluation  DVT Prophylaxis: Pneumatic Compression Devices  Horta Catheter: not present  Code Status: full code    Disposition Plan   Expected discharge: 2 - 3 days, recommended to prior living arrangement once adequate pain management/ tolerating PO medications and neuro exam remains stable after nsg evaluation.  Entered: Titi Preston DO 03/11/2019, 5:52 PM     The patient's care was discussed with the Patient and Patient's Family.    Titi Preston DO  [facility]    ______________________________________________________________________    Chief Complaint   Fall and head trauma    History is obtained from the patient    History of Present Illness   Ashli Moscoso is a 73 year old female who fell while at the [employer] today. She tripped on some carpet while waiting behind some children. Her son was there with her and notes no loss of consciousness but a loud noise when her head hit. She denies headache, but has some underlying dementia and lives in a memory care unit. She does not remember having any symptoms prior to hitting her head. She also struck her left knee.  She does not take any blood thinning medications.    Review of Systems    Review of systems not obtained due to patient factors - dementia    Past Medical History    I have reviewed this patient's medical history and updated it with pertinent information if needed.   Past Medical History:   Diagnosis Date     Hyperlipidemia      Meniere's disease      Unspecified essential hypertension        Past Surgical History   I have reviewed this patient's surgical history and updated it with pertinent information if needed.  Past Surgical History:   Procedure Laterality Date     HC REPAIR FEMORAL HERNIA,BRENDA         Social History   I have reviewed this patient's social history and updated it with pertinent information if needed.  Social History     Tobacco Use     Smoking status: Never Smoker     Smokeless tobacco: Never Used   Substance Use Topics     Alcohol use: No     Drug use: No       Family History   I have reviewed this patient's family history and updated it with pertinent information if needed.   Family History   Problem Relation Age of Onset     Heart Disease Mother      Dementia Father        Prior to Admission Medications   Prior to Admission Medications   Prescriptions Last Dose Informant Patient Reported? Taking?   ASPIRIN PO   Yes No   Sig: Take 81 mg by mouth daily   Calcium Citrate-Vitamin D (CITRACAL + D PO)   Yes No   Sig: Take  by mouth.   Ferrous Sulfate Dried (SLOW FE) 160 (50 FE) MG tablet   Yes No   Sig: Take 1 tablet by mouth daily (with breakfast).   Multiple Vitamins-Minerals (CENTRUM SILVER) per tablet   Yes No   Sig: Take 1 tablet by mouth   clotrimazole (LOTRIMIN) 1 % cream   No No   Sig: Apply topically 2 times daily   donepezil (ARICEPT) 10 MG tablet   No No   Sig: Take 1 tablet (10 mg) by mouth At Bedtime   escitalopram (LEXAPRO) 10 MG tablet   No No   Sig: Take 1 tablet (10 mg) by mouth daily   fenofibrate 160 MG tablet   Yes No   hydrochlorothiazide (HYDRODIURIL) 25 MG tablet   No No    Sig: Take 1 tablet (25 mg) by mouth daily   lisinopril (PRINIVIL/ZESTRIL) 10 MG tablet   No No   Sig: Take 1 tablet (10 mg) by mouth daily   loratadine (CLARITIN) 10 MG tablet   Yes No   Sig: Take 10 mg by mouth daily.   meclizine (ANTIVERT) 25 MG tablet   Yes No   Sig: Take 25 mg by mouth 2 times daily PRN   metoprolol (LOPRESSOR) 50 MG tablet   No No   Sig: Take 1 tablet by mouth 2 times daily.   niacin 100 MG tablet   Yes No   Sig: Take 100 mg by mouth 2 times daily.   nystatin (NYSTOP) 550661 UNIT/GM POWD   No No   Sig: Apply 15 g topically 2 times daily   simvastatin (ZOCOR) 20 MG tablet   No No   Sig: Take 1 tablet by mouth At Bedtime.      Facility-Administered Medications: None     Allergies   Allergies   Allergen Reactions     Azithromycin Unknown     Bacitracin Unknown     Ciprofloxacin Unknown     Patient does not want to be prescribed this medication     Codeine Unknown     Diazepam Unknown     Epinephrine Other (See Comments)     Can have a small dose     Moxifloxacin Unknown     Prochlorperazine Unknown     Rofecoxib Unknown     Sulfa Drugs      Latex Rash       Physical Exam   Vital Signs: Temp: 97.8  F (36.6  C)   BP: 148/78   Heart Rate: 70 Resp: 16 SpO2: 100 % O2 Device: None (Room air)    Weight: 150 lbs 0 oz  Physical Exam   HENT:   Head: Normocephalic and atraumatic.   Eyes: EOM are normal. Pupils are equal, round, and reactive to light.   Neck: Normal range of motion. Neck supple.   Cardiovascular: Normal rate, regular rhythm, normal heart sounds and intact distal pulses.   Pulmonary/Chest: Effort normal and breath sounds normal.   Abdominal: Soft. Bowel sounds are normal.   Neurological: She is alert. She displays normal reflexes. No cranial nerve deficit or sensory deficit. She exhibits normal muscle tone.   Oriented x 1     Skin: Skin is warm and dry. Capillary refill takes less than 2 seconds.   Psychiatric: She has a normal mood and affect. Her behavior is normal. Judgment and thought  content normal.         Data   Data reviewed today: I reviewed all medications, new labs and imaging results over the last 24 hours. I personally reviewed the head CT image(s) showing small bilateral SAH.    Recent Labs   Lab 03/11/19  1650   WBC 9.7   HGB 12.1   MCV 92      INR 0.98      POTASSIUM 3.2*   CHLORIDE 103   CO2 30   BUN 16   CR 0.78   ANIONGAP 5   CARL 9.4   *     Recent Results (from the past 24 hour(s))   XR Knee Left 3 Views    Narrative    XR LEFT KNEE THREE VIEWS  3/11/2019 3:42 PM     HISTORY: Fall, left knee pain.      Impression    IMPRESSION: Prepatellar soft tissue swelling. No apparent fracture.  Degenerative arthrosis with mild to moderate lateral and mild  medial/patellofemoral compartment marginal osteophytic spurring. Mild  lateral compartment joint space narrowing is also suggested.    ALICE OLIVER MD   CT Head w/o Contrast   Result Value    Radiologist flags Acute hemorrhage (AA)    Narrative    CT SCAN OF THE HEAD WITHOUT CONTRAST   3/11/2019 4:34 PM     HISTORY: Fall, left scalp hematoma.    TECHNIQUE:  Axial images of the head and coronal reformations without  IV contrast material. Radiation dose for this scan was reduced using  automated exposure control, adjustment of the mA and/or kV according  to patient size, or iterative reconstruction technique.    COMPARISON: None.    FINDINGS:  Small volume of bifrontal subarachnoid hemorrhage is  present. No significant mass effect. No evidence of hydrocephalus. The  cerebral hemispheres, brainstem, and cerebellum otherwise demonstrate  normal morphology and attenuation. No evidence of significant mass  effect, midline shift, or herniation. No evidence of acute ischemia. A  1 cm dural-based calcification is present along the left anterior falx  cerebri. Left frontal scalp contusion is present without evidence of  underlying skull fracture. Tympanic cavities, mastoid cavities, and  paranasal sinuses are well aerated.       Impression    IMPRESSION:  Small volume bifrontal subarachnoid hemorrhage. No  significant mass effect or hydrocephalus. Calcified lesion.    [Critical Result: Acute hemorrhage]    Finding was identified on 3/11/2019 4:38 PM.     Dr. Castle  was contacted by me on 3/11/2019 4:40 PM and  verbalized understanding of the critical result.     AISHA TOBIAS MD

## 2019-03-11 NOTE — ED NOTES
Transferred to [facility] room. Report at bedside to SUE Fermin. Dr. Valdez at bedside.

## 2019-03-12 ENCOUNTER — APPOINTMENT (OUTPATIENT)
Dept: OCCUPATIONAL THERAPY | Facility: CLINIC | Age: 74
DRG: 087 | End: 2019-03-12
Attending: HOSPITALIST
Payer: COMMERCIAL

## 2019-03-12 ENCOUNTER — APPOINTMENT (OUTPATIENT)
Dept: CT IMAGING | Facility: CLINIC | Age: 74
DRG: 087 | End: 2019-03-12
Attending: HOSPITALIST
Payer: COMMERCIAL

## 2019-03-12 ENCOUNTER — APPOINTMENT (OUTPATIENT)
Dept: PHYSICAL THERAPY | Facility: CLINIC | Age: 74
DRG: 087 | End: 2019-03-12
Attending: HOSPITALIST
Payer: COMMERCIAL

## 2019-03-12 LAB
ANION GAP SERPL CALCULATED.3IONS-SCNC: 5 MMOL/L (ref 3–14)
BUN SERPL-MCNC: 11 MG/DL (ref 7–30)
CALCIUM SERPL-MCNC: 8.6 MG/DL (ref 8.5–10.1)
CHLORIDE SERPL-SCNC: 106 MMOL/L (ref 94–109)
CO2 SERPL-SCNC: 28 MMOL/L (ref 20–32)
CREAT SERPL-MCNC: 0.77 MG/DL (ref 0.52–1.04)
ERYTHROCYTE [DISTWIDTH] IN BLOOD BY AUTOMATED COUNT: 13.1 % (ref 10–15)
GFR SERPL CREATININE-BSD FRML MDRD: 77 ML/MIN/{1.73_M2}
GLUCOSE BLDC GLUCOMTR-MCNC: 97 MG/DL (ref 70–99)
GLUCOSE SERPL-MCNC: 99 MG/DL (ref 70–99)
HCT VFR BLD AUTO: 32.2 % (ref 35–47)
HGB BLD-MCNC: 10.8 G/DL (ref 11.7–15.7)
MAGNESIUM SERPL-MCNC: 1.7 MG/DL (ref 1.6–2.3)
MCH RBC QN AUTO: 30.7 PG (ref 26.5–33)
MCHC RBC AUTO-ENTMCNC: 33.5 G/DL (ref 31.5–36.5)
MCV RBC AUTO: 92 FL (ref 78–100)
PHOSPHATE SERPL-MCNC: 3.4 MG/DL (ref 2.5–4.5)
PLATELET # BLD AUTO: 250 10E9/L (ref 150–450)
POTASSIUM SERPL-SCNC: 3.3 MMOL/L (ref 3.4–5.3)
POTASSIUM SERPL-SCNC: 4.2 MMOL/L (ref 3.4–5.3)
RBC # BLD AUTO: 3.52 10E12/L (ref 3.8–5.2)
SODIUM SERPL-SCNC: 139 MMOL/L (ref 133–144)
WBC # BLD AUTO: 6.9 10E9/L (ref 4–11)

## 2019-03-12 PROCEDURE — 84132 ASSAY OF SERUM POTASSIUM: CPT | Performed by: INTERNAL MEDICINE

## 2019-03-12 PROCEDURE — 85027 COMPLETE CBC AUTOMATED: CPT | Performed by: HOSPITALIST

## 2019-03-12 PROCEDURE — 70450 CT HEAD/BRAIN W/O DYE: CPT

## 2019-03-12 PROCEDURE — 97165 OT EVAL LOW COMPLEX 30 MIN: CPT | Mod: GO | Performed by: OCCUPATIONAL THERAPIST

## 2019-03-12 PROCEDURE — 97116 GAIT TRAINING THERAPY: CPT | Mod: GP

## 2019-03-12 PROCEDURE — 25000132 ZZH RX MED GY IP 250 OP 250 PS 637: Performed by: INTERNAL MEDICINE

## 2019-03-12 PROCEDURE — 99233 SBSQ HOSP IP/OBS HIGH 50: CPT | Performed by: INTERNAL MEDICINE

## 2019-03-12 PROCEDURE — 40000895 ZZH STATISTIC SLP IP EVAL DEFER: Performed by: SPEECH-LANGUAGE PATHOLOGIST

## 2019-03-12 PROCEDURE — 84100 ASSAY OF PHOSPHORUS: CPT | Performed by: HOSPITALIST

## 2019-03-12 PROCEDURE — 83735 ASSAY OF MAGNESIUM: CPT | Performed by: HOSPITALIST

## 2019-03-12 PROCEDURE — 00000146 ZZHCL STATISTIC GLUCOSE BY METER IP

## 2019-03-12 PROCEDURE — 12000000 ZZH R&B MED SURG/OB

## 2019-03-12 PROCEDURE — 97161 PT EVAL LOW COMPLEX 20 MIN: CPT | Mod: GP

## 2019-03-12 PROCEDURE — 36415 COLL VENOUS BLD VENIPUNCTURE: CPT | Performed by: INTERNAL MEDICINE

## 2019-03-12 PROCEDURE — 99221 1ST HOSP IP/OBS SF/LOW 40: CPT | Performed by: PHYSICIAN ASSISTANT

## 2019-03-12 PROCEDURE — 97535 SELF CARE MNGMENT TRAINING: CPT | Mod: GO | Performed by: OCCUPATIONAL THERAPIST

## 2019-03-12 PROCEDURE — 97530 THERAPEUTIC ACTIVITIES: CPT | Mod: GP

## 2019-03-12 PROCEDURE — 36415 COLL VENOUS BLD VENIPUNCTURE: CPT | Performed by: HOSPITALIST

## 2019-03-12 PROCEDURE — 80048 BASIC METABOLIC PNL TOTAL CA: CPT | Performed by: HOSPITALIST

## 2019-03-12 RX ORDER — POTASSIUM CHLORIDE 29.8 MG/ML
20 INJECTION INTRAVENOUS
Status: DISCONTINUED | OUTPATIENT
Start: 2019-03-12 | End: 2019-03-13 | Stop reason: HOSPADM

## 2019-03-12 RX ORDER — ESCITALOPRAM OXALATE 10 MG/1
10 TABLET ORAL DAILY
Status: DISCONTINUED | OUTPATIENT
Start: 2019-03-12 | End: 2019-03-13 | Stop reason: HOSPADM

## 2019-03-12 RX ORDER — POTASSIUM CHLORIDE 7.45 MG/ML
10 INJECTION INTRAVENOUS
Status: DISCONTINUED | OUTPATIENT
Start: 2019-03-12 | End: 2019-03-13 | Stop reason: HOSPADM

## 2019-03-12 RX ORDER — DONEPEZIL HYDROCHLORIDE 10 MG/1
10 TABLET, FILM COATED ORAL AT BEDTIME
Status: DISCONTINUED | OUTPATIENT
Start: 2019-03-12 | End: 2019-03-13 | Stop reason: HOSPADM

## 2019-03-12 RX ORDER — LISINOPRIL 10 MG/1
10 TABLET ORAL DAILY
Status: DISCONTINUED | OUTPATIENT
Start: 2019-03-12 | End: 2019-03-13 | Stop reason: HOSPADM

## 2019-03-12 RX ORDER — METOPROLOL TARTRATE 25 MG/1
25 TABLET, FILM COATED ORAL 2 TIMES DAILY
Status: DISCONTINUED | OUTPATIENT
Start: 2019-03-12 | End: 2019-03-13 | Stop reason: HOSPADM

## 2019-03-12 RX ORDER — POTASSIUM CHLORIDE 1500 MG/1
20-40 TABLET, EXTENDED RELEASE ORAL
Status: DISCONTINUED | OUTPATIENT
Start: 2019-03-12 | End: 2019-03-13 | Stop reason: HOSPADM

## 2019-03-12 RX ORDER — SIMVASTATIN 20 MG
20 TABLET ORAL AT BEDTIME
Status: DISCONTINUED | OUTPATIENT
Start: 2019-03-12 | End: 2019-03-13 | Stop reason: HOSPADM

## 2019-03-12 RX ORDER — POTASSIUM CHLORIDE 1.5 G/1.58G
20-40 POWDER, FOR SOLUTION ORAL
Status: DISCONTINUED | OUTPATIENT
Start: 2019-03-12 | End: 2019-03-13 | Stop reason: HOSPADM

## 2019-03-12 RX ORDER — MECLIZINE HCL 12.5 MG 12.5 MG/1
25 TABLET ORAL DAILY PRN
Status: DISCONTINUED | OUTPATIENT
Start: 2019-03-12 | End: 2019-03-13 | Stop reason: HOSPADM

## 2019-03-12 RX ORDER — ACETAMINOPHEN 325 MG/1
650 TABLET ORAL EVERY 4 HOURS PRN
Status: DISCONTINUED | OUTPATIENT
Start: 2019-03-12 | End: 2019-03-13 | Stop reason: HOSPADM

## 2019-03-12 RX ORDER — FENOFIBRATE 160 MG/1
160 TABLET ORAL DAILY
Status: DISCONTINUED | OUTPATIENT
Start: 2019-03-12 | End: 2019-03-13 | Stop reason: HOSPADM

## 2019-03-12 RX ORDER — POTASSIUM CL/LIDO/0.9 % NACL 10MEQ/0.1L
10 INTRAVENOUS SOLUTION, PIGGYBACK (ML) INTRAVENOUS
Status: DISCONTINUED | OUTPATIENT
Start: 2019-03-12 | End: 2019-03-13 | Stop reason: HOSPADM

## 2019-03-12 RX ADMIN — DONEPEZIL HYDROCHLORIDE 10 MG: 10 TABLET ORAL at 21:40

## 2019-03-12 RX ADMIN — METOPROLOL TARTRATE 25 MG: 25 TABLET ORAL at 21:39

## 2019-03-12 RX ADMIN — ESCITALOPRAM OXALATE 10 MG: 10 TABLET ORAL at 14:06

## 2019-03-12 RX ADMIN — LISINOPRIL 10 MG: 10 TABLET ORAL at 14:06

## 2019-03-12 RX ADMIN — METOPROLOL TARTRATE 25 MG: 25 TABLET ORAL at 14:06

## 2019-03-12 RX ADMIN — POTASSIUM CHLORIDE 40 MEQ: 1500 TABLET, EXTENDED RELEASE ORAL at 19:28

## 2019-03-12 RX ADMIN — SIMVASTATIN 20 MG: 20 TABLET, FILM COATED ORAL at 21:39

## 2019-03-12 RX ADMIN — FENOFIBRATE 160 MG: 160 TABLET ORAL at 14:06

## 2019-03-12 RX ADMIN — ACETAMINOPHEN 650 MG: 325 TABLET, FILM COATED ORAL at 14:25

## 2019-03-12 RX ADMIN — POTASSIUM CHLORIDE 20 MEQ: 1500 TABLET, EXTENDED RELEASE ORAL at 14:25

## 2019-03-12 ASSESSMENT — ACTIVITIES OF DAILY LIVING (ADL)
ADLS_ACUITY_SCORE: 15
ADLS_ACUITY_SCORE: 15
ADLS_ACUITY_SCORE: 14
ADLS_ACUITY_SCORE: 14
ADLS_ACUITY_SCORE: 15
ADLS_ACUITY_SCORE: 10.5

## 2019-03-12 NOTE — PROGRESS NOTES
[facility]    Hospitalist Progress Note      Assessment & Plan   Ashli Moscoso is a 73 year old female admitted on 3/11/2019 with fall and small bilateral SAH     Bilateral frontal SAH, hemorrhage secondary to fall  CT head repeat showed interval decrease in subarachnoid hemorrhage   - neurologically stable.   - neurosurgery following  - hold ASA  - PT/OT eval  - blood pressure SBP < 150  - restart PTA antihypertensive  - will transfer to neuro floor with q4hr neuro checks     Alzheimers dementia  - very anxious this morning. Family present at bedside  - restart PTA aricept  - will be less anxious once out of ICU as less monitors and screens  - monitor for delirium     Knee pain  - no fracture on the XR  - tylenol prn     HTN  - restart PTA metoprolol and Lisinopril  - hold of on hydrochlorothiazide today     HLD  - resume PTA Fenofibrate and statin     Meniere's disease  - no longer taking oxazepam  - continue prn meclizine     Hypokalemia:  - replace per protocol    Communications: discussed with RN      DVT Prophylaxis: Pneumatic Compression Devices  Code Status: Full Code    Disposition: Expected discharge in 1 days if remains stable    Alok Mariano MD  Text Page  (7am to 6pm)    Interval History   Anxious today just for being in ICU room. dtr and son are at bedside.   C/o mild headache, no visual changes. Denies n/v.     -Data reviewed today: I reviewed all new labs and imaging results over the last 24 hours. I personally reviewed the head CT image(s) showing improved SAH.    Physical Exam   Temp: 99.2  F (37.3  C) Temp src: Oral BP: 141/64 Pulse: 75 Heart Rate: 65 Resp: 11 SpO2: 98 % O2 Device: None (Room air)    Vitals:    03/11/19 1505   Weight: 68 kg (150 lb)     Vital Signs with Ranges  Temp:  [97.7  F (36.5  C)-99.2  F (37.3  C)] 99.2  F (37.3  C)  Pulse:  [65-93] 75  Heart Rate:  [62-84] 65  Resp:  [10-22] 11  BP: (111-181)/(51-82) 141/64  SpO2:  [95 %-100 %] 98 %  No  intake/output data recorded.    Constitutional: Alert, awake and no apparent distress, bit anxious  Respiratory: CTA bilaterally, no wheezing  Cardiovascular: regular rate and rhythm  GI: soft and non-tender  Skin/Integumen: warm and dry  Other:      Medications     - MEDICATION INSTRUCTIONS -         donepezil  10 mg Oral At Bedtime     escitalopram  10 mg Oral Daily     fenofibrate  160 mg Oral Daily     lisinopril  10 mg Oral Daily     metoprolol tartrate  25 mg Oral BID       Data   Recent Labs   Lab 03/12/19  0409 03/11/19  1650   WBC 6.9 9.7   HGB 10.8* 12.1   MCV 92 92    304   INR  --  0.98    138   POTASSIUM 3.3* 3.2*   CHLORIDE 106 103   CO2 28 30   BUN 11 16   CR 0.77 0.78   ANIONGAP 5 5   CARL 8.6 9.4   GLC 99 113*       Recent Results (from the past 24 hour(s))   XR Knee Left 3 Views    Narrative    XR LEFT KNEE THREE VIEWS  3/11/2019 3:42 PM     HISTORY: Fall, left knee pain.      Impression    IMPRESSION: Prepatellar soft tissue swelling. No apparent fracture.  Degenerative arthrosis with mild to moderate lateral and mild  medial/patellofemoral compartment marginal osteophytic spurring. Mild  lateral compartment joint space narrowing is also suggested.    ALICE OLIVER MD   CT Head w/o Contrast   Result Value    Radiologist flags Acute hemorrhage (AA)    Narrative    CT SCAN OF THE HEAD WITHOUT CONTRAST   3/11/2019 4:34 PM     HISTORY: Fall, left scalp hematoma.    TECHNIQUE:  Axial images of the head and coronal reformations without  IV contrast material. Radiation dose for this scan was reduced using  automated exposure control, adjustment of the mA and/or kV according  to patient size, or iterative reconstruction technique.    COMPARISON: None.    FINDINGS:  Small volume of bifrontal subarachnoid hemorrhage is  present. No significant mass effect. No evidence of hydrocephalus. The  cerebral hemispheres, brainstem, and cerebellum otherwise demonstrate  normal morphology and attenuation.  No evidence of significant mass  effect, midline shift, or herniation. No evidence of acute ischemia. A  1 cm dural-based calcification is present along the left anterior falx  cerebri. Left frontal scalp contusion is present without evidence of  underlying skull fracture. Tympanic cavities, mastoid cavities, and  paranasal sinuses are well aerated.      Impression    IMPRESSION:  Small volume bifrontal subarachnoid hemorrhage. No  significant mass effect or hydrocephalus. Calcified lesion.    [Critical Result: Acute hemorrhage]    Finding was identified on 3/11/2019 4:38 PM.     Dr. Castle  was contacted by me on 3/11/2019 4:40 PM and  verbalized understanding of the critical result.     AISHA TOBIAS MD   CT Head w/o Contrast    Narrative    CT OF THE HEAD WITHOUT CONTRAST March 12, 2019 5:38 AM     HISTORY: Subarachnoid hemorrhage, interim monitoring.    TECHNIQUE: 5 mm thick axial CT images of the head were acquired  without IV contrast material. Radiation dose for this scan was reduced  using automated exposure control, adjustment of the mA and/or kV  according to patient size, or iterative reconstruction technique.    COMPARISON: Head CT 3/11/2019.    FINDINGS: There has been a marked interval decrease in conspicuity of  subarachnoid hemorrhage scattered along the cerebral convexities on  both sides since comparison study with only small foci of subarachnoid  hemorrhage and sulci at the lateral aspects of the frontal lobes on  both sides. There is no evidence for new or increasing intracranial  hemorrhage. There is mild diffuse cerebral volume loss. There are  subtle patchy areas of decreased density in the cerebral white matter  bilaterally that are consistent with sequela of chronic small vessel  ischemic disease.     The ventricles and basal cisterns are within normal limits in  configuration given the degree of cerebral volume loss. There is no  midline shift.     No intracranial hemorrhage or recent  infarct.    The visualized paranasal sinuses are well-aerated. There is no  mastoiditis. There are no fractures of the visualized bones.      Impression    IMPRESSION:   1. Interval decrease in conspicuity of subarachnoid hemorrhage now  with solitary foci of faint subarachnoid hemorrhage located in the mid  frontal lobes bilaterally. No evidence for new or increased  intracranial hemorrhage.  2. Diffuse cerebral volume loss and cerebral white matter changes  consistent with chronic small vessel ischemic disease.

## 2019-03-12 NOTE — PLAN OF CARE
Discharge Planner SLP   Patient plan for discharge: Unknown  Current status: Epic notes were reviewed, and RN was consulted this am.  Patient passed the nursing swallow screen with no concerns reported.  No speech-language deficits noted.  Patient is also reported to be at baseline for cognitive function.  Given no swallow or speech-language concerns, plan to defer SLP services at this time.  Barriers to return to prior living situation: No SLP barriers  Recommendations for discharge: No SLP needs; return to memory care  Rationale for recommendations: No SLP needs       Entered by: Pamela Chery 03/12/2019 9:06 AM

## 2019-03-12 NOTE — PLAN OF CARE
Pt. Remained confused, forgetful at baseline per dtr, transfer to 703 2 per w/c with no changes.  VSS, k replaced, bp meds started.  Cont. To monitor.

## 2019-03-12 NOTE — CONSULTS
Brief Neurology Note      Consult received. After chart review and discussion with primary service, patient is back to baseline (baseline dementia), and follow up head CT has near resolution of small bilateral traumatic SAH.  No further neurological workup is recommended at this time. Will hold off on formal consult unless new issues arise.      Text Page    Samantha Spencer, QUINTON, FNP-BC, RN CNRN SCRN

## 2019-03-12 NOTE — PLAN OF CARE
A&Ox3, disoriented to time. Dementia at baseline resides in a memory care unit. Restless, repeated requests, rapid speech. Denies N/T. Equal BUE/BLE 5/5 strength.  Neuros intact. VSS. SZR Precautions, no szr activity. Tele to be placed. Regular diet. Up with A1/GB. BLE knee, leg, ankle, foot +2 edema. Daughter at bedside, will have a 1:1 sitter due to impulsivity. Denies pain. Plan pending progress.

## 2019-03-12 NOTE — PROGRESS NOTES
03/12/19 1154   Quick Adds   Type of Visit Initial Occupational Therapy Evaluation   Living Environment   Lives With facility resident  (memory care unit)   Living Arrangements assisted living   Home Accessibility no concerns   Living Environment Comment pt lives in memory care, has assist with showers, but I with mobility ambulating to bathroom and dining room I'ly.    Self-Care   Usual Activity Tolerance good   Current Activity Tolerance moderate   Regular Exercise No   Equipment Currently Used at Home none   Activity/Exercise/Self-Care Comment has assist for showers    Functional Level   Cognition 1 - attention or memory deficits   Fall history within last six months yes   Number of times patient has fallen within last six months 1   Which of the above functional risks had a recent onset or change? fall history   General Information   Onset of Illness/Injury or Date of Surgery - Date 03/11/19   Patient/Family Goals Statement none stated   Additional Occupational Profile Info/Pertinent History of Current Problem pt is a 73 year old female admitted on 3/11/2019 with fall and small bilateral SAH   Precautions/Limitations fall precautions   General Observations pt is sitting up in chair, agreeable to OT eval   General Info Comments son and daughter present   Cognitive Status Examination   Orientation orientation to person, place and time   Level of Consciousness alert;confused   Follows Commands (Cognition) WNL   Memory impaired   Attention Distractible during evaluation   Organization/Problem Solving Problem solving impaired   Cognitive Comment pt has history of dementia, lives in memory care unit   Visual Perception   Visual Perception Comments denie blurry or double vision   Sensory Examination   Sensory Comments denies numbness or tingling   Pain Assessment   Patient Currently in Pain Yes, see Vital Sign flowsheet   Integumentary/Edema   Integumentary/Edema (elbow and L shoulder)   Range of Motion (ROM)   ROM  Comment Pt complains of L shoulder and elbow pain with AROM. ROM WFL in R UE.    Strength   Strength Comments R overall 3+/5 due to pain, R UE overall 4/5   Hand Strength   Hand Strength Comments WFL   Muscle Tone Assessment   Muscle Tone Quick Adds No deficits were identified   Transfer Skill: Bed to Chair/Chair to Bed   Level of Bowman: Bed to Chair contact guard   Physical Assist/Nonphysical Assist: Bed to Chair 1 person assist   Transfer Skill: Sit to Stand   Level of Bowman: Sit/Stand contact guard   Physical Assist/Nonphysical Assist: Sit/Stand 1 person assist   Transfer Skill: Toilet Transfer   Level of Bowman: Toilet contact guard   Physical Assist/Nonphysical Assist: Toilet 1 person assist   Lower Body Dressing   Level of Bowman: Dress Lower Body stand-by assist   Grooming   Level of Bowman: Grooming stand-by assist   Physical Assist/Nonphysical Assist: Grooming supervision   Eating/Self Feeding   Level of Bowman: Eating independent   Instrumental Activities of Daily Living (IADL)   IADL Comments memory care unit manages IADL's   Activities of Daily Living Analysis   Impairments Contributing to Impaired Activities of Daily Living balance impaired;cognition impaired;pain   General Therapy Interventions   Planned Therapy Interventions ADL retraining;transfer training   Clinical Impression   Criteria for Skilled Therapeutic Interventions Met yes, treatment indicated   OT Diagnosis decreased I with ADL's and functional mobility   Influenced by the following impairments decreased balance and    Assessment of Occupational Performance 1-3 Performance Deficits   Identified Performance Deficits decreased I with BR transfers and grooming   Clinical Decision Making (Complexity) Low complexity   Therapy Frequency daily  (1-2 sessions)   Predicted Duration of Therapy Intervention (days/wks) 2 days   Anticipated Discharge Disposition Long Term Care Facility   Risks and Benefits of  "Treatment have been explained. Yes   Patient, Family & other staff in agreement with plan of care Yes   [facility] TM \"6 Clicks\"   2016, Trustees of [facility], under license to [employer].  All rights reserved.   6 Clicks Short Forms Daily Activity Inpatient Short Form   [facility]  \"6 Clicks\" Daily Activity Inpatient Short Form   1. Putting on and taking off regular lower body clothing? 4 - None   2. Bathing (including washing, rinsing, drying)? 3 - A Little   3. Toileting, which includes using toilet, bedpan or urinal? 3 - A Little   4. Putting on and taking off regular upper body clothing? 4 - None   5. Taking care of personal grooming such as brushing teeth? 3 - A Little   6. Eating meals? 4 - None   Daily Activity Raw Score (Score out of 24.Lower scores equate to lower levels of function) 21   Total Evaluation Time   Total Evaluation Time (Minutes) 12     "

## 2019-03-12 NOTE — PLAN OF CARE
The patient remains at her baseline alertness and orientation. She is alert to self, place, and situation. She is occasionally confused to time, but this is baseline with her Alzheimer's disease.   Strength and sensation are equal bilaterally in all extremities. PERRLA at 2mm and brisk. No drifting of limbs.   Patient voiding via bedpan. Good urine output.   BP within parameters 100-150 systolic. Sinus rhythm on monitor.   Zander Chavez RN 7:45 AM 03/12/19

## 2019-03-12 NOTE — PLAN OF CARE
From the ED at 1730, fully alert and oriented without physical deficits.  Bruising noted on left knee, rest of skin looks good. Neuro checks have remained stable and -130's systolic.  Patient and family aware of plan of care.

## 2019-03-12 NOTE — PLAN OF CARE
Discharge Planner OT   Patient plan for discharge: none stated  Current status: eval completed and treatment initiated. Pt is a 73 year old female admitted on 3/11/2019 with fall and small bilateral SAH. At baseline she resides at a memory care unit. Was I with dressing and toilet transfers and hygiene. She has assist of supervision for showers. Ambulates without AE. Currently she is CGA-min A with sit to stand and ambulation to sink for grooming. Anticipate she is close to baseline with dressing, grooming and toilet hygiene. Family reports she is a little more agitated and anxious than normal given the new setting..   Barriers to return to prior living situation: none anticipated  Recommendations for discharge: back to memory care with home therapy eval for home safety and functional mobility in familiar environment  Rationale for recommendations: would be beneficial to evaluate her back in her own setting        Entered by: Mónica Mccormick 03/12/2019 12:25 PM

## 2019-03-12 NOTE — PROGRESS NOTES
ICU Multi-Disciplinary Note  Patient condition reviewed and discussed while on multidisciplinary rounds today. Ashli Moscoso is a 73 year old female with PMH dementia, HTN who was admitted with bifrontal traumatic SAH after a ground level, mechanical fall. She is currently hemodynamically stable, maintaining her SBP <150 without the use of infusion or prn antihypertensives. She is mildly hypokalemic this morning, replacement has been ordered.  The Critical Care service will continue to follow peripherally while the patient is within the ICU. We are readily available should issues arise. Please feel free to contact us for critical care issues with which we may be of assistance. For all other concerns, please contact primary service first.   CLAUDIA Armendariz

## 2019-03-12 NOTE — PLAN OF CARE
Discharge Planner PT   Patient plan for discharge: Return to memory care  Current status: Pt adm on 3/11/19 after tripping and falling, found to have B frontal SAH. At baseline, pt lives in memory care, has assist with showering, independent with ambulation without use of assistive device. PT evaluation completed and treatment initiated. Pt is min assist for bed mobility, sit to stand and bed to chair transfers, and to ambulate 150' with monitor cart for UE support, will plan to trial FWW.  Barriers to return to prior living situation: Decreased balance  Recommendations for discharge: Return to memory care  Rationale for recommendations: Pt appears to be near baseline level of function, does currently demonstrate some balance deficits, will continue to benefit from PT during hospital stay to address these deficits and maximize independence and safety. Will continue to assess for need for assistive device.       Entered by: Areli Connolly 03/12/2019 3:02 PM

## 2019-03-12 NOTE — CONSULTS
[facility]    Neurosurgery Consultation     Date of Admission:  3/11/2019  Date of Consult (When I saw the patient): 03/12/19    Assessment & Plan   Ashli Moscoso is a 73 year old female who was admitted on 3/11/2019. I was asked to see the patient for SAH.    Active Problems:    SAH (subarachnoid hemorrhage) (H)    Assessment: small bifrontal SAH. Non surgical. Plan per NCC.           I have discussed the following assessment and plan with Dr. Najera, who is in agreement with the initial plan and will follow up with further consultation recommendations.    Michelet Dent PA-C  [facility]  [address] [address]    Tel [phone]  Pager [phone]        Code Status    Full Code    Reason for Consult   Reason for consult: SAH    Primary Care Physician   Gauri Bolton    Chief Complaint   SAH    History is obtained from the patient and electronic health record    History of Present Illness   Ashli Moscoso is a 73 year old female who presents with small subarachnoid hemorrhage following a fall.  She had been out eating at a [employer] restaurant, when she tripped and fell on some carpet. She lives in a memory unit and has some underlying dementia. No anticoagulation on board. Currently denies headache.     Past Medical History   I have reviewed this patient's medical history and updated it with pertinent information if needed.   Past Medical History:   Diagnosis Date     Hyperlipidemia      Meniere's disease      Unspecified essential hypertension        Past Surgical History   I have reviewed this patient's surgical history and updated it with pertinent information if needed.  Past Surgical History:   Procedure Laterality Date     HC REPAIR FEMORAL HERNIA,BRENDA         Prior to Admission Medications   Prior to Admission Medications   Prescriptions Last Dose Informant Patient Reported? Taking?   Ferrous Sulfate Dried (SLOW  FE) 160 (50 FE) MG tablet 3/11/2019 at am  Yes Yes   Sig: Take 1 tablet by mouth every other day    Multiple Vitamins-Minerals (CENTRUM SILVER) per tablet 3/11/2019 at am  Yes Yes   Sig: Take 1 tablet by mouth daily    acetaminophen (TYLENOL) 325 MG tablet prn  Yes Yes   Sig: Take 650 mg by mouth every 6 hours as needed for mild pain   aspirin (ASA) 81 MG chewable tablet 3/11/2019 at am  Yes Yes   Sig: Take 81 mg by mouth daily   calcium citrate-vitamin D (CITRACAL) 315-250 MG-UNIT TABS per tablet 3/11/2019 at am  Yes Yes   Sig: Take 1 tablet by mouth daily   clotrimazole (LOTRIMIN) 1 % cream 3/11/2019 at am  No Yes   Sig: Apply topically 2 times daily   donepezil (ARICEPT) 10 MG tablet 3/10/2019 at pm  No Yes   Sig: Take 1 tablet (10 mg) by mouth At Bedtime   escitalopram (LEXAPRO) 10 MG tablet 3/11/2019 at am  No Yes   Sig: Take 1 tablet (10 mg) by mouth daily   fenofibrate 160 MG tablet 3/11/2019 at am  Yes Yes   Sig: Take 160 mg by mouth daily with food    hydrochlorothiazide (HYDRODIURIL) 25 MG tablet 3/11/2019 at am  No Yes   Sig: Take 1 tablet (25 mg) by mouth daily   lisinopril (PRINIVIL/ZESTRIL) 10 MG tablet 3/11/2019 at am  No Yes   Sig: Take 1 tablet (10 mg) by mouth daily   loratadine (CLARITIN) 10 MG tablet 3/11/2019 at am  Yes Yes   Sig: Take 10 mg by mouth daily.   meclizine (ANTIVERT) 25 MG tablet prn  Yes Yes   Sig: Take 25-50 mg by mouth daily as needed for dizziness (vertigo) PRN   metoprolol tartrate (LOPRESSOR) 25 MG tablet 3/11/2019 at am  Yes Yes   Sig: Take 25 mg by mouth 2 times daily   niacin 100 MG tablet 3/11/2019 at am  Yes Yes   Sig: Take 100 mg by mouth daily    nystatin (MYCOSTATIN) 953439 UNIT/GM external powder prn  Yes Yes   Sig: Apply topically 2 times daily as needed   simvastatin (ZOCOR) 20 MG tablet 3/10/2019 at pm  No Yes   Sig: Take 1 tablet by mouth At Bedtime.      Facility-Administered Medications: None     Allergies   Allergies   Allergen Reactions     Azithromycin  "Unknown     Bacitracin Unknown     Ciprofloxacin Unknown     Patient does not want to be prescribed this medication     Codeine Unknown     Diazepam Unknown     Epinephrine Other (See Comments)     Can have a small dose     Moxifloxacin Unknown     Prochlorperazine Unknown     Rofecoxib Unknown     Sulfa Drugs      Latex Rash       Social History   I have reviewed this patient's social history and updated it with pertinent information if needed. Ashli Moscoso  reports that  has never smoked. she has never used smokeless tobacco. She reports that she does not drink alcohol or use drugs.    Family History   I have reviewed this patient's family history and updated it with pertinent information if needed.   Family History   Problem Relation Age of Onset     Heart Disease Mother      Dementia Father        Review of Systems   Negative except for HPI and PMH.     Physical Exam   Temp: 99.2  F (37.3  C) Temp src: Oral BP: 141/64 Pulse: 75 Heart Rate: 65 Resp: 11 SpO2: 98 % O2 Device: None (Room air)    Vital Signs with Ranges  Temp:  [97.7  F (36.5  C)-99.2  F (37.3  C)] 99.2  F (37.3  C)  Pulse:  [65-93] 75  Heart Rate:  [62-84] 65  Resp:  [10-22] 11  BP: (111-181)/(51-82) 141/64  SpO2:  [95 %-100 %] 98 %  150 lbs 0 oz    Heart Rate: 65, Blood pressure 141/64, pulse 75, temperature 99.2  F (37.3  C), temperature source Oral, resp. rate 11, height 5' 5\" (1.651 m), weight 150 lb (68 kg), SpO2 98 %, not currently breastfeeding.  150 lbs 0 oz  HEENT:  Normocephalic, atraumatic.  PERRLA.  EOM s intact.   Neck:  Supple, non-tender, without lymphadenopathy.  Heart:  No peripheral edema  Lungs:  No SOB  Abdomen:  Soft, non-tender, non-distended.  Skin:  Warm and dry, good capillary refill.  Extremities:  Good radial and dorsalis pedis pulses bilaterally, no edema, cyanosis or clubbing.    NEUROLOGICAL EXAMINATION:     Mental status:  Alert and Oriented x 3, speech is fluent.  Cranial nerves:  II-XII intact.   Motor:  Strength " is 5/5 throughout the upper and lower extremities  Sensation:  intact  Reflexes:   Negative Babinski.  Negative Clonus.    Coordination:  Smooth finger to nose testing.   Negative pronator drift.  Smooth tandem walking  Gait:  Stable, no difficulty with heel or toe walking.      Data   All new lab and imaging data was personally reviewed by me.  CT:IMPRESSION:  Small volume bifrontal subarachnoid hemorrhage. No  significant mass effect or hydrocephalus. Calcified lesion.  MRI:  CBC RESULTS:   Recent Labs   Lab Test 03/12/19  0409   WBC 6.9   RBC 3.52*   HGB 10.8*   HCT 32.2*   MCV 92   MCH 30.7   MCHC 33.5   RDW 13.1        Basic Metabolic Panel:  Lab Results   Component Value Date     03/12/2019      Lab Results   Component Value Date    POTASSIUM 3.3 03/12/2019     Lab Results   Component Value Date    CHLORIDE 106 03/12/2019     Lab Results   Component Value Date    CARL 8.6 03/12/2019     Lab Results   Component Value Date    CO2 28 03/12/2019     Lab Results   Component Value Date    BUN 11 03/12/2019     Lab Results   Component Value Date    CR 0.77 03/12/2019     Lab Results   Component Value Date    GLC 99 03/12/2019     INR:  Lab Results   Component Value Date    INR 0.98 03/11/2019

## 2019-03-12 NOTE — PROGRESS NOTES
03/12/19 1430   Quick Adds   Type of Visit Initial PT Evaluation   Living Environment   Lives With facility resident  (memory care)   Living Arrangements assisted living   Home Accessibility no concerns   Transportation Anticipated family or friend will provide   Living Environment Comment Pt lives in memory care, has assist with showers but otherwise ambulates independently.   Self-Care   Usual Activity Tolerance good   Current Activity Tolerance moderate   Regular Exercise No   Equipment Currently Used at Home none   Functional Level Prior   Ambulation 0-->independent   Transferring 0-->independent   Number of times patient has fallen within last six months 1   Prior Functional Level Comment Pt fell on day of admission leading to this hospitalization   General Information   Onset of Illness/Injury or Date of Surgery - Date 03/11/19   Referring Physician Titi Preston, DO   Patient/Family Goals Statement To go home   Pertinent History of Current Problem (include personal factors and/or comorbidities that impact the POC) Pt adm on 3/11/19 after tripping and falling while out to eat with family, found to have small volume B frontal SAH. Pt with history of Alzhemier's dementia, Meniere's disease, HTN, and incr chol.   Precautions/Limitations fall precautions   General Info Comments Activity: Ambulate with assist   Cognitive Status Examination   Orientation person   Level of Consciousness alert   Cognitive Comment Pt quite anxious over new surroundings, cooperative, follows functional commands, is able to be redirected throughout session   Pain Assessment   Patient Currently in Pain (min c/o headache, does not rate)   Integumentary/Edema   Integumentary/Edema no deficits were identifed   Posture    Posture Forward head position   Range of Motion (ROM)   ROM Comment B LE ROM WFL   Strength   Strength Comments B LE strength 3/5   Bed Mobility   Bed Mobility Comments Min assist   Transfer Skills   Transfer  "Comments Min assist   Gait   Gait Comments 20' with min assist using monitor cart for support   Balance   Balance Comments Standing statically without UE support needs close supervision, min assist for dynamic standing tasks   Sensory Examination   Sensory Perception Comments Denies numbness/tingling   General Therapy Interventions   Planned Therapy Interventions balance training;bed mobility training;gait training;strengthening;transfer training;progressive activity/exercise   Clinical Impression   Criteria for Skilled Therapeutic Intervention yes, treatment indicated   PT Diagnosis Impaired gait   Influenced by the following impairments Decreased strength, decreased balance, decreased activity tolerance   Functional limitations due to impairments Decreased ability to safely participate in daily tasks   Clinical Presentation Stable/Uncomplicated   Clinical Presentation Rationale Current presentation, [facility]   Clinical Decision Making (Complexity) Low complexity   Predicted Duration of Therapy Intervention (days/wks) 3 days   Anticipated Discharge Disposition Other (see comments)  (return to memory care)   Risk & Benefits of therapy have been explained Yes   Patient, Family & other staff in agreement with plan of care Yes   Clinical Impression Comments Pt presents with decreased strength, balance, and activity tolerance. Anticiapte pt can return to memory care facility possibly with asssistive device pending progress during hospital stay. Will continue to follow for PT intervention during hospital stay to further progress mobility, balance, and safety.   [facility] [employer] \"6 Clicks\"   2016, Trustees of [facility], under license to [employer].  All rights reserved.   6 Clicks Short Forms Basic Mobility Inpatient Short Form   [facility] [employer]  \"6 Clicks\" V.2 Basic Mobility Inpatient Short Form   1. Turning from your back to your side while in a flat bed without using bedrails? 3 - A Little   2. " Moving from lying on your back to sitting on the side of a flat bed without using bedrails? 3 - A Little   3. Moving to and from a bed to a chair (including a wheelchair)? 3 - A Little   4. Standing up from a chair using your arms (e.g., wheelchair, or bedside chair)? 3 - A Little   5. To walk in hospital room? 3 - A Little   6. Climbing 3-5 steps with a railing? 3 - A Little   Basic Mobility Raw Score (Score out of 24.Lower scores equate to lower levels of function) 18   Total Evaluation Time   Total Evaluation Time (Minutes) 10

## 2019-03-13 ENCOUNTER — APPOINTMENT (OUTPATIENT)
Dept: PHYSICAL THERAPY | Facility: CLINIC | Age: 74
DRG: 087 | End: 2019-03-13
Payer: COMMERCIAL

## 2019-03-13 VITALS
WEIGHT: 150.4 LBS | RESPIRATION RATE: 14 BRPM | HEART RATE: 75 BPM | BODY MASS INDEX: 25.06 KG/M2 | SYSTOLIC BLOOD PRESSURE: 127 MMHG | OXYGEN SATURATION: 96 % | TEMPERATURE: 98.5 F | HEIGHT: 65 IN | DIASTOLIC BLOOD PRESSURE: 66 MMHG

## 2019-03-13 LAB
ANION GAP SERPL CALCULATED.3IONS-SCNC: 8 MMOL/L (ref 3–14)
BUN SERPL-MCNC: 15 MG/DL (ref 7–30)
CALCIUM SERPL-MCNC: 8.8 MG/DL (ref 8.5–10.1)
CHLORIDE SERPL-SCNC: 103 MMOL/L (ref 94–109)
CO2 SERPL-SCNC: 26 MMOL/L (ref 20–32)
CREAT SERPL-MCNC: 0.8 MG/DL (ref 0.52–1.04)
ERYTHROCYTE [DISTWIDTH] IN BLOOD BY AUTOMATED COUNT: 13.1 % (ref 10–15)
GFR SERPL CREATININE-BSD FRML MDRD: 74 ML/MIN/{1.73_M2}
GLUCOSE BLDC GLUCOMTR-MCNC: 105 MG/DL (ref 70–99)
GLUCOSE SERPL-MCNC: 133 MG/DL (ref 70–99)
HCT VFR BLD AUTO: 35.1 % (ref 35–47)
HGB BLD-MCNC: 11.7 G/DL (ref 11.7–15.7)
MAGNESIUM SERPL-MCNC: 1.9 MG/DL (ref 1.6–2.3)
MCH RBC QN AUTO: 31 PG (ref 26.5–33)
MCHC RBC AUTO-ENTMCNC: 33.3 G/DL (ref 31.5–36.5)
MCV RBC AUTO: 93 FL (ref 78–100)
PHOSPHATE SERPL-MCNC: 3.1 MG/DL (ref 2.5–4.5)
PLATELET # BLD AUTO: 249 10E9/L (ref 150–450)
POTASSIUM SERPL-SCNC: 3.8 MMOL/L (ref 3.4–5.3)
RBC # BLD AUTO: 3.77 10E12/L (ref 3.8–5.2)
SODIUM SERPL-SCNC: 137 MMOL/L (ref 133–144)
WBC # BLD AUTO: 5.9 10E9/L (ref 4–11)

## 2019-03-13 PROCEDURE — 83735 ASSAY OF MAGNESIUM: CPT | Performed by: INTERNAL MEDICINE

## 2019-03-13 PROCEDURE — 00000146 ZZHCL STATISTIC GLUCOSE BY METER IP

## 2019-03-13 PROCEDURE — 85027 COMPLETE CBC AUTOMATED: CPT | Performed by: INTERNAL MEDICINE

## 2019-03-13 PROCEDURE — 99238 HOSP IP/OBS DSCHRG MGMT 30/<: CPT | Performed by: INTERNAL MEDICINE

## 2019-03-13 PROCEDURE — 25000132 ZZH RX MED GY IP 250 OP 250 PS 637: Performed by: INTERNAL MEDICINE

## 2019-03-13 PROCEDURE — 80048 BASIC METABOLIC PNL TOTAL CA: CPT | Performed by: INTERNAL MEDICINE

## 2019-03-13 PROCEDURE — 36415 COLL VENOUS BLD VENIPUNCTURE: CPT | Performed by: INTERNAL MEDICINE

## 2019-03-13 PROCEDURE — 84100 ASSAY OF PHOSPHORUS: CPT | Performed by: INTERNAL MEDICINE

## 2019-03-13 PROCEDURE — 97116 GAIT TRAINING THERAPY: CPT | Mod: GP | Performed by: PHYSICAL THERAPIST

## 2019-03-13 RX ADMIN — FENOFIBRATE 160 MG: 160 TABLET ORAL at 08:25

## 2019-03-13 RX ADMIN — ESCITALOPRAM OXALATE 10 MG: 10 TABLET ORAL at 08:25

## 2019-03-13 RX ADMIN — METOPROLOL TARTRATE 25 MG: 25 TABLET ORAL at 08:25

## 2019-03-13 RX ADMIN — LISINOPRIL 10 MG: 10 TABLET ORAL at 08:25

## 2019-03-13 ASSESSMENT — ACTIVITIES OF DAILY LIVING (ADL)
ADLS_ACUITY_SCORE: 10.5
ADLS_ACUITY_SCORE: 15

## 2019-03-13 ASSESSMENT — MIFFLIN-ST. JEOR: SCORE: 1198.09

## 2019-03-13 NOTE — PLAN OF CARE
Physical Therapy Discharge Summary    Reason for therapy discharge:    Discharged to home with home therapy.    Progress towards therapy goal(s). See goals on Care Plan in [facility] electronic health record for goal details.  Goals partially met.  Barriers to achieving goals:   discharge from facility.    Therapy recommendation(s):    Continued therapy is recommended.  Rationale/Recommendations:  Pt will benefit from continued skilled PT services to progress activity tolerance, safety and IND with mobility.

## 2019-03-13 NOTE — PROGRESS NOTES
The following appointments have been scheduled:    Apr 10, 2019 12:45 PM CDT  CT HEAD W/O CONTRAST with SHCT1  [facility] ([facility])    Apr 10, 2019  1:20 PM CDT  Return Visit with Keyona Moffett PA-C  [facility] ([facility])

## 2019-03-13 NOTE — PLAN OF CARE
Pt AO to self.  Refused some neuro assessments, otherwise neuros intact.  Denied pain.  VSS.  Regular diet, tolerating well.  Some bruising noted on face.

## 2019-03-13 NOTE — PLAN OF CARE
Discharge Planner PT   Patient plan for discharge: Return to memory care  Current status: pt somewhat anxious and confused throughout session. Pt requires SBA for sit to stand. Pt ambulated 310' with no assistive device and CGA-SBA with slow, tentative gait pattern. Pt ambulated 350' with FWW and SBA with decreased lateral sway, and reports feeling safer and more steady.   Barriers to return to prior living situation: Decreased balance  Recommendations for discharge: UPDATE: Return to memory care with home PT for increased carry over for safe use of FWW at home   Rationale for recommendations: Pt appears to be near baseline level of function but currently demonstrates balance deficits and will benefit from home PT to ensure safe carry-over with use of new device at home.        Entered by: Ashley Gallego 03/13/2019 9:42 AM

## 2019-03-13 NOTE — PLAN OF CARE
Oriented to self only. Restless, impulsive, agitated at times. BLEs 2+ edema. Bruise noted on L knee. Up Ax1 with gait belt and walker. K+ 3.3- replaced. Recheck scheduled for 2330. Fair appetite. Voiding adequately. PIV saline locked.

## 2019-03-13 NOTE — DISCHARGE INSTRUCTIONS
Tylenol at home to help with head and knee pain.  Follow-up with your primary in the next 2 days for recheck of symptoms.  Return to the emergency department for any changing worsening symptoms, increasing confusion, significant visual changes, loss of consciousness, seizure-like activity, new concerns.

## 2019-03-13 NOTE — PROGRESS NOTES
LUIZ  D/I:  LUIZ spoke with patient's daughter, Idie.  SW updated daughter on the patient's status and upcoming discharge today.  Pt's daughter and son are planning to transport patient back to her home at the [facility] @1200.  LUIZ called and spoke to Areli at the [facility] ([phone]).  LUIZ confirmed with the facility staff that the patient will be returning today.  LUIZ faxed facility patient's H&P, lastest nursing notes, and discharge orders.  P: SW will continue to follow and monitor for a safe, discharge plan.

## 2019-03-13 NOTE — PROVIDER NOTIFICATION
"Call placed to hospitalist: \"When should patient follow up with her PCP at memory care? Nothing written on discharge orders, thanks!\"    Received return call.  Orders are for patient to be seen by nursing home physician within 7 days.  These are on discharge orders.  "

## 2019-03-13 NOTE — DISCHARGE SUMMARY
[facility]  Hospitalist Discharge Summary       Date of Admission:  3/11/2019  Date of Discharge:  3/13/2019  Discharging Provider: Alok Mariano MD      Discharge Diagnoses   Bilateral frontal subarachnoid hemorrhage secondary to mechanical fall  Alzheimer's dementia  Knee pain  Hypertension  Hyperlipidemia  Ménière's disease  Hypokalemia    Follow-ups Needed After Discharge   Follow-up Appointments     Follow Up and recommended labs and tests      [facility] in one month with head CT   prior.  Please call the clinic at [phone] to schedule your   appointment.         Follow-up and recommended labs and tests       Follow up with primary care provider, Gauri Bolton, within 7 days for   hospital follow- up.  No follow up labs or test are needed.             Unresulted Labs Ordered in the Past 30 Days of this Admission     No orders found from 1/10/2019 to 3/12/2019.          Discharge Disposition   Discharged to memory  Condition at discharge: Stable    Hospital Course      Ashli Moscoso is a 73 year old female admitted on 3/11/2019 with fall and small bilateral SAH     Bilateral frontal SAH, hemorrhage secondary to fall  CT head repeat showed interval decrease in subarachnoid hemorrhage.  Evaluated by neurosurgery and non surgical. Patient without complaint of headache.  She does not have any focal neurologic deficit at this time.  Aspirin will be held until follow-up appointment with neurosurgery in four weeks.  Home physical therapy ordered per physical therapy recommendation To work with her balance and appropriate use of walker.     Alzheimers dementia Continue with prior to admission Aricept     Left Knee pain Secondary to fall and bruising.  X-ray is negative for fractures.  Patient has good range of motion without significant pain at this time.  May utilize Tylenol as needed for pain control     HTN  - Resume PTA  Hydrochlorothiazide,metoprolol and Lisinopril     HLD continue PTA Fenofibrate and statin     Meniere's disease continue prn meclizine     Hypokalemia: Corrected with replacement      Consultations This Hospital Stay   PHYSICAL THERAPY ADULT IP CONSULT  OCCUPATIONAL THERAPY ADULT IP CONSULT  SPEECH LANGUAGE PATH ADULT IP CONSULT  SOCIAL WORK IP CONSULT  SMOKING CESSATION PROGRAM IP CONSULT  PHARMACY IP CONSULT  NEUROSURGERY IP CONSULT  NEUROLOGY IP CONSULT    Code Status   Full Code    Time Spent on this Encounter   I, Alok Mariano, personally saw the patient today and spent less than or equal to 30 minutes discharging this patient.       Alok Mariano MD  [facility]  ______________________________________________________________________    Physical Exam   Vital Signs: Temp: 98.5  F (36.9  C) Temp src: Oral BP: 127/66 Pulse: 75 Heart Rate: 66 Resp: 14 SpO2: 96 % O2 Device: None (Room air)    Weight: 150 lbs 6.4 oz  General Appearance: Alert, awake, no apparent distress  Respiratory: Clear to auscultation bilaterally, no wheezing  Cardiovascular: Regular rate and rhythm  GI: Soft and nontender  Skin: Warm and dry  Other:  left knee with bruising, But good range of motion without pain       Primary Care Physician   Gauri Bolton    Immunizations       Discharge Orders      CT Head w/o contrast*     Home Care PT Referral for Hospital Discharge      Follow Up and recommended labs and tests    Please [facility] in one month with head CT prior.  Please call the clinic at [phone] to schedule your appointment.     Follow-up and recommended labs and tests     Follow up with primary care provider, Gauri Bolton, within 7 days for hospital follow- up.  No follow up labs or test are needed.     Activity    Your activity upon discharge: activity as tolerated     MD face to face encounter    Documentation of Face to Face and Certification for Home Health  Services    I certify that patient: Ashli Moscoso is under my care and that I, or a nurse practitioner or physician's assistant working with me, had a face-to-face encounter that meets the physician face-to-face encounter requirements with this patient on: 3/13/2019.    This encounter with the patient was in whole, or in part, for the following medical condition, which is the primary reason for home health care: fall, physical deconditioning.    I certify that, based on my findings, the following services are medically necessary home health services: Physical Therapy.    My clinical findings support the need for the above services because: Physical Therapy Services are needed to assess and treat the following functional impairments: mobility and balance, appropriate use of walker.    Further, I certify that my clinical findings support that this patient is homebound (i.e. absences from home require considerable and taxing effort and are for medical reasons or [employer] services or infrequently or of short duration when for other reasons) because: Requires assistance of another person or specialized equipment to access medical services because patient: Is prone to wander/get lost without assistance...    Based on the above findings. I certify that this patient is confined to the home and needs intermittent skilled nursing care, physical therapy and/or speech therapy.  The patient is under my care, and I have initiated the establishment of the plan of care.  This patient will be followed by a physician who will periodically review the plan of care.  Physician/Provider to provide follow up care: Gauri Bolton    Attending hospital physician (the Medicare certified [facility] provider): Alok Mariano  Physician Signature: See electronic signature associated with these discharge orders.  Date: 3/13/2019     Full Code     Diet    Follow this diet upon discharge: Orders Placed This Encounter      Regular Diet  Adult       Significant Results and Procedures   Most Recent 3 CBC's:  Recent Labs   Lab Test 03/13/19  0830 03/12/19  0409 03/11/19  1650   WBC 5.9 6.9 9.7   HGB 11.7 10.8* 12.1   MCV 93 92 92    250 304     Most Recent 3 BMP's:  Recent Labs   Lab Test 03/13/19  0830 03/12/19  2313 03/12/19  0409 03/11/19  1650     --  139 138   POTASSIUM 3.8 4.2 3.3* 3.2*   CHLORIDE 103  --  106 103   CO2 26  --  28 30   BUN 15  --  11 16   CR 0.80  --  0.77 0.78   ANIONGAP 8  --  5 5   CARL 8.8  --  8.6 9.4   *  --  99 113*   ,   Results for orders placed or performed during the hospital encounter of 03/11/19   CT Head w/o Contrast     Value    Radiologist flags Acute hemorrhage (AA)    Narrative    CT SCAN OF THE HEAD WITHOUT CONTRAST   3/11/2019 4:34 PM     HISTORY: Fall, left scalp hematoma.    TECHNIQUE:  Axial images of the head and coronal reformations without  IV contrast material. Radiation dose for this scan was reduced using  automated exposure control, adjustment of the mA and/or kV according  to patient size, or iterative reconstruction technique.    COMPARISON: None.    FINDINGS:  Small volume of bifrontal subarachnoid hemorrhage is  present. No significant mass effect. No evidence of hydrocephalus. The  cerebral hemispheres, brainstem, and cerebellum otherwise demonstrate  normal morphology and attenuation. No evidence of significant mass  effect, midline shift, or herniation. No evidence of acute ischemia. A  1 cm dural-based calcification is present along the left anterior falx  cerebri. Left frontal scalp contusion is present without evidence of  underlying skull fracture. Tympanic cavities, mastoid cavities, and  paranasal sinuses are well aerated.      Impression    IMPRESSION:  Small volume bifrontal subarachnoid hemorrhage. No  significant mass effect or hydrocephalus. Calcified lesion.    [Critical Result: Acute hemorrhage]    Finding was identified on 3/11/2019 4:38 PM.     Dr. Castle   was contacted by me on 3/11/2019 4:40 PM and  verbalized understanding of the critical result.     AISHA TOBIAS MD   XR Knee Left 3 Views    Narrative    XR LEFT KNEE THREE VIEWS  3/11/2019 3:42 PM     HISTORY: Fall, left knee pain.      Impression    IMPRESSION: Prepatellar soft tissue swelling. No apparent fracture.  Degenerative arthrosis with mild to moderate lateral and mild  medial/patellofemoral compartment marginal osteophytic spurring. Mild  lateral compartment joint space narrowing is also suggested.    ALICE OLIVER MD   CT Head w/o Contrast    Narrative    CT OF THE HEAD WITHOUT CONTRAST March 12, 2019 5:38 AM     HISTORY: Subarachnoid hemorrhage, interim monitoring.    TECHNIQUE: 5 mm thick axial CT images of the head were acquired  without IV contrast material. Radiation dose for this scan was reduced  using automated exposure control, adjustment of the mA and/or kV  according to patient size, or iterative reconstruction technique.    COMPARISON: Head CT 3/11/2019.    FINDINGS: There has been a marked interval decrease in conspicuity of  subarachnoid hemorrhage scattered along the cerebral convexities on  both sides since comparison study with only small foci of subarachnoid  hemorrhage and sulci at the lateral aspects of the frontal lobes on  both sides. There is no evidence for new or increasing intracranial  hemorrhage. There is mild diffuse cerebral volume loss. There are  subtle patchy areas of decreased density in the cerebral white matter  bilaterally that are consistent with sequela of chronic small vessel  ischemic disease.     The ventricles and basal cisterns are within normal limits in  configuration given the degree of cerebral volume loss. There is no  midline shift.     No intracranial hemorrhage or recent infarct.    The visualized paranasal sinuses are well-aerated. There is no  mastoiditis. There are no fractures of the visualized bones.      Impression    IMPRESSION:   1.  Interval decrease in conspicuity of subarachnoid hemorrhage now  with solitary foci of faint subarachnoid hemorrhage located in the mid  frontal lobes bilaterally. No evidence for new or increased  intracranial hemorrhage.  2. Diffuse cerebral volume loss and cerebral white matter changes  consistent with chronic small vessel ischemic disease.             KATIE WOMACK MD       Discharge Medications   Current Discharge Medication List      CONTINUE these medications which have NOT CHANGED    Details   acetaminophen (TYLENOL) 325 MG tablet Take 650 mg by mouth every 6 hours as needed for mild pain      calcium citrate-vitamin D (CITRACAL) 315-250 MG-UNIT TABS per tablet Take 1 tablet by mouth daily      clotrimazole (LOTRIMIN) 1 % cream Apply topically 2 times daily  Qty: 15 g, Refills: 3    Associated Diagnoses: Intertrigo      donepezil (ARICEPT) 10 MG tablet Take 1 tablet (10 mg) by mouth At Bedtime  Qty: 90 tablet, Refills: 1    Associated Diagnoses: Late onset Alzheimer's disease without behavioral disturbance      escitalopram (LEXAPRO) 10 MG tablet Take 1 tablet (10 mg) by mouth daily  Qty: 90 tablet, Refills: 1    Associated Diagnoses: Late onset Alzheimer's disease without behavioral disturbance; Anxiety      fenofibrate 160 MG tablet Take 160 mg by mouth daily with food       Ferrous Sulfate Dried (SLOW FE) 160 (50 FE) MG tablet Take 1 tablet by mouth every other day       hydrochlorothiazide (HYDRODIURIL) 25 MG tablet Take 1 tablet (25 mg) by mouth daily  Qty: 90 tablet, Refills: 3    Associated Diagnoses: Essential hypertension      lisinopril (PRINIVIL/ZESTRIL) 10 MG tablet Take 1 tablet (10 mg) by mouth daily  Qty: 90 tablet, Refills: 3    Associated Diagnoses: Essential hypertension      loratadine (CLARITIN) 10 MG tablet Take 10 mg by mouth daily.      meclizine (ANTIVERT) 25 MG tablet Take 25-50 mg by mouth daily as needed for dizziness (vertigo) PRN      metoprolol tartrate (LOPRESSOR) 25 MG tablet  Take 25 mg by mouth 2 times daily      Multiple Vitamins-Minerals (CENTRUM SILVER) per tablet Take 1 tablet by mouth daily       niacin 100 MG tablet Take 100 mg by mouth daily       nystatin (MYCOSTATIN) 953831 UNIT/GM external powder Apply topically 2 times daily as needed      simvastatin (ZOCOR) 20 MG tablet Take 1 tablet by mouth At Bedtime.  Qty: 90 tablet, Refills: 0    Associated Diagnoses: Hyperlipidemia         STOP taking these medications       aspirin (ASA) 81 MG chewable tablet Comments:   Reason for Stopping:             Allergies   Allergies   Allergen Reactions     Azithromycin Unknown     Bacitracin Unknown     Ciprofloxacin Unknown     Patient does not want to be prescribed this medication     Codeine Unknown     Diazepam Unknown     Epinephrine Other (See Comments)     Can have a small dose     Moxifloxacin Unknown     Prochlorperazine Unknown     Rofecoxib Unknown     Sulfa Drugs      Latex Rash

## 2019-03-13 NOTE — PLAN OF CARE
Occupational Therapy Discharge Summary    Reason for therapy discharge:    Discharged to home with home therapy.    Progress towards therapy goal(s). See goals on Care Plan in [facility] electronic health record for goal details.  Goals not met.  Barriers to achieving goals:   discharge from facility.    Therapy recommendation(s):    Continued therapy is recommended.  Rationale/Recommendations:  HHOT for home safety at memory care unit.

## 2019-03-13 NOTE — PROGRESS NOTES
LUIZ  D/I:  LUIZ called and spoke to facility to check PT provider that the facility uses.  Family and patient prefer to use the same agency that the facility uses.  Facility staff use inside agency, [employer], and Trevor is in charges, which they use for all of their patients.  Bree stated that they have an office within the building, and they are with Medicare.  P: Will continue to follow and monitor for a safe, discharge plan.

## 2019-03-13 NOTE — PROVIDER NOTIFICATION
"Call placed to Edda ELLIOTT NP \"Patient KARISSA. in [phone]. When should she be seen for follow up and when can she resume her ASA? Please call [facility] at [phone] thanks!\"    Spoke with Edda from neurosurgery.  Patient to follow up with neurosurgery in one month with a follow up CT at that time. No blood thinners until seen in follow up.  "

## 2019-03-13 NOTE — PLAN OF CARE
Neuro exam intact except for baseline dementia.  Able to state name, birthday and that she fell and hit her head.  VSS, tele NSR.  Tolerating regular diet, voiding adequately, stress incontinence at times.  Up with SBA & GB, walker issued at discharge due to dizziness at times.  Discharge paperwork reviewed with patient and daughter Idie, all questions answered.

## 2019-04-10 ENCOUNTER — OFFICE VISIT (OUTPATIENT)
Dept: NEUROSURGERY | Facility: CLINIC | Age: 74
End: 2019-04-10
Attending: PHYSICIAN ASSISTANT
Payer: COMMERCIAL

## 2019-04-10 ENCOUNTER — HOSPITAL ENCOUNTER (OUTPATIENT)
Dept: CT IMAGING | Facility: CLINIC | Age: 74
Discharge: HOME | End: 2019-04-10
Attending: NURSE PRACTITIONER | Admitting: NURSE PRACTITIONER
Payer: COMMERCIAL

## 2019-04-10 VITALS
TEMPERATURE: 98 F | HEART RATE: 64 BPM | WEIGHT: 159.8 LBS | BODY MASS INDEX: 27.28 KG/M2 | OXYGEN SATURATION: 98 % | SYSTOLIC BLOOD PRESSURE: 144 MMHG | DIASTOLIC BLOOD PRESSURE: 64 MMHG | HEIGHT: 64 IN

## 2019-04-10 DIAGNOSIS — I60.9 SUBARACHNOID HEMORRHAGE (H): ICD-10-CM

## 2019-04-10 DIAGNOSIS — I60.9 SAH (SUBARACHNOID HEMORRHAGE) (H): Primary | ICD-10-CM

## 2019-04-10 PROCEDURE — G0463 HOSPITAL OUTPT CLINIC VISIT: HCPCS | Mod: 25

## 2019-04-10 PROCEDURE — 99213 OFFICE O/P EST LOW 20 MIN: CPT | Performed by: PHYSICIAN ASSISTANT

## 2019-04-10 PROCEDURE — 70450 CT HEAD/BRAIN W/O DYE: CPT

## 2019-04-10 ASSESSMENT — MIFFLIN-ST. JEOR: SCORE: 1224.85

## 2019-04-10 ASSESSMENT — PAIN SCALES - GENERAL: PAINLEVEL: NO PAIN (0)

## 2019-04-10 NOTE — NURSING NOTE
"Ashli Moscoso is a 73 year old female who presents for:  Chief Complaint   Patient presents with     Follow Up     4-6 week follow up for SAH.         Initial Vitals:  /64 (BP Location: Left arm, Patient Position: Sitting, Cuff Size: Adult Large)   Pulse 64   Temp 98  F (36.7  C) (Oral)   Ht 5' 4\" (1.626 m)   Wt 159 lb 12.8 oz (72.5 kg)   SpO2 98%   BMI 27.43 kg/m   Estimated body mass index is 27.43 kg/m  as calculated from the following:    Height as of this encounter: 5' 4\" (1.626 m).    Weight as of this encounter: 159 lb 12.8 oz (72.5 kg).. Body surface area is 1.81 meters squared. BP completed using cuff size: large  No Pain (0)        Nursing Comments: No pain today         Mariana Fisher"

## 2019-04-10 NOTE — PROGRESS NOTES
[facility]  Neurosurgery followup:    HPI: 4 week follow up for small bifrontal SAH s/p fall. Doing well. States she has felt a little off with her gait but no headaches or other neuro changes.     Exam:  Constitutional:  Alert, well nourished, NAD.  HEENT: Normocephalic, atraumatic.   Pulm:  Without shortness of breath or audible adventitious respiratory sounds.  CV:  No pitting edema of BLE.  Brisk capillary refill, CMS intact.    Neurological:  Awake  Alert  Oriented x 3  Speech clear  Cranial nerves II - XII intact  PERRL  EOMI  Face symmetric  Tongue midline  Motor exam: 5/5 strength in all four extremities.   Sensation: intact  Finger to Nose: smooth  Pronator drift: negative  Gait: Able to stand from a seated position. Normal non-antalgic, non-myelopathic gait.  Imaging:   CT OF THE HEAD WITHOUT CONTRAST 4/10/2019 12:29 PM    COMPARISON: Head CT 3/12/2019   HISTORY: SAH (subarachnoid hemorrhage) (H).  TECHNIQUE: 5 mm thick axial CT images of the head were acquired without IV contrast material.  FINDINGS: There is mild diffuse cerebral volume loss. There are subtle patchy areas of decreased density in the cerebral white matter bilaterally that are consistent with sequela of chronic small vessel  ischemic disease.  The ventricles and basal cisterns are within normal limits in configuration given the degree of cerebral volume loss. There is no midline shift. There are no extra-axial fluid collections.  No intracranial hemorrhage, mass or recent infarct. This represents interval resolution of small foci of subarachnoid hemorrhage located at the lateral aspects of the frontal lobes bilaterally.  The visualized paranasal sinuses are well-aerated. There is no mastoiditis. There are no fractures of the visualized bones.                                                              IMPRESSION: Diffuse cerebral volume loss and cerebral white matter changes consistent with chronic small vessel ischemic  disease. No  evidence for acute intracranial pathology.  A/P: 4 week follow up for small bifrontal SAH s/p fall. Doing well without headaches or other neurological changes. Head CT with resolution of SAH. Neuro intact on exam. Discussed if she does continue to have balance issues can follow up with PCP. Follow up as needed. Patient voiced understanding and agreement.      Keyona Moffett PA-C  [facility]  [address] [address]    Tel [phone]  Pager [phone]

## 2019-04-10 NOTE — LETTER
4/10/2019         RE: Ashli Moscoso  C/o Jillian Perez  [address] [place] [address]        Dear Colleague,    Thank you for referring your patient, Ashli Moscoso, to the [facility]. Please see a copy of my visit note below.    [facility]  Neurosurgery followup:    HPI: 4 week follow up for small bifrontal SAH s/p fall. Doing well. States she has felt a little off with her gait but no headaches or other neuro changes.     Exam:  Constitutional:  Alert, well nourished, NAD.  HEENT: Normocephalic, atraumatic.   Pulm:  Without shortness of breath or audible adventitious respiratory sounds.  CV:  No pitting edema of BLE.  Brisk capillary refill, CMS intact.    Neurological:  Awake  Alert  Oriented x 3  Speech clear  Cranial nerves II - XII intact  PERRL  EOMI  Face symmetric  Tongue midline  Motor exam: 5/5 strength in all four extremities.   Sensation: intact  Finger to Nose: smooth  Pronator drift: negative  Gait: Able to stand from a seated position. Normal non-antalgic, non-myelopathic gait.  Imaging:   CT OF THE HEAD WITHOUT CONTRAST 4/10/2019 12:29 PM    COMPARISON: Head CT 3/12/2019   HISTORY: SAH (subarachnoid hemorrhage) (H).  TECHNIQUE: 5 mm thick axial CT images of the head were acquired without IV contrast material.  FINDINGS: There is mild diffuse cerebral volume loss. There are subtle patchy areas of decreased density in the cerebral white matter bilaterally that are consistent with sequela of chronic small vessel  ischemic disease.  The ventricles and basal cisterns are within normal limits in configuration given the degree of cerebral volume loss. There is no midline shift. There are no extra-axial fluid collections.  No intracranial hemorrhage, mass or recent infarct. This represents interval resolution of small foci of subarachnoid hemorrhage located at the lateral aspects of the frontal lobes bilaterally.  The visualized paranasal sinuses are  well-aerated. There is no mastoiditis. There are no fractures of the visualized bones.                                                              IMPRESSION: Diffuse cerebral volume loss and cerebral white matter changes consistent with chronic small vessel ischemic disease. No  evidence for acute intracranial pathology.  A/P: 4 week follow up for small bifrontal SAH s/p fall. Doing well without headaches or other neurological changes. Head CT with resolution of SAH. Neuro intact on exam. Discussed if she does continue to have balance issues can follow up with PCP. Follow up as needed. Patient voiced understanding and agreement.      Keyona Moffett PA-C  [facility]  [address] [address]    Tel [phone]  Pager [phone]        Again, thank you for allowing me to participate in the care of your patient.        Sincerely,        Keyona Moffett PA-C

## 2019-04-24 ENCOUNTER — OFFICE VISIT (OUTPATIENT)
Dept: NEUROLOGY | Facility: CLINIC | Age: 74
End: 2019-04-24
Payer: COMMERCIAL

## 2019-04-24 VITALS
SYSTOLIC BLOOD PRESSURE: 133 MMHG | BODY MASS INDEX: 27.29 KG/M2 | DIASTOLIC BLOOD PRESSURE: 71 MMHG | OXYGEN SATURATION: 100 % | HEART RATE: 58 BPM | WEIGHT: 159 LBS

## 2019-04-24 DIAGNOSIS — F02.80 LATE ONSET ALZHEIMER'S DISEASE WITHOUT BEHAVIORAL DISTURBANCE (H): ICD-10-CM

## 2019-04-24 DIAGNOSIS — G30.1 LATE ONSET ALZHEIMER'S DISEASE WITHOUT BEHAVIORAL DISTURBANCE (H): ICD-10-CM

## 2019-04-24 DIAGNOSIS — F41.9 ANXIETY: ICD-10-CM

## 2019-04-24 PROCEDURE — 99213 OFFICE O/P EST LOW 20 MIN: CPT | Performed by: PSYCHIATRY & NEUROLOGY

## 2019-04-24 RX ORDER — DONEPEZIL HYDROCHLORIDE 10 MG/1
10 TABLET, FILM COATED ORAL AT BEDTIME
Qty: 90 TABLET | Refills: 1 | Status: SHIPPED | OUTPATIENT
Start: 2019-04-24

## 2019-04-24 RX ORDER — ESCITALOPRAM OXALATE 10 MG/1
10 TABLET ORAL DAILY
Qty: 90 TABLET | Refills: 1 | Status: SHIPPED | OUTPATIENT
Start: 2019-04-24 | End: 2021-02-11

## 2019-04-24 ASSESSMENT — PAIN SCALES - GENERAL: PAINLEVEL: NO PAIN (0)

## 2019-04-24 NOTE — PROGRESS NOTES
Visit Date:   04/24/2019      INTERVAL HISTORY:  Ashli Moscoso returns for followup of Alzheimer disease.  She is accompanied by her daughter, Jillian.      The patient did trip and fall in a restaurant on 03/11/2019, and struck her head.  She did have a small traumatic subarachnoid hemorrhage.  She did have a followup head CAT scan done recently and the subarachnoid blood had resolved.  She did not develop any headache or any residual neurologic symptoms from the head injury.      She continues to reside at the [facility], which is a memory care facility.      She continues on donepezil 10 mg for memory, as well as Lexapro 10 mg for anxiety.      She has a history of Meniere's disease, but has not had any recurrent vertigo.      PHYSICAL EXAMINATION:  Examination reveals her heart rate is 58.  Blood pressure 133/71.      She is a bit confused, particularly about of the events surrounding the head injury.  She could not recall exactly when this had happened.      She is not oriented at all to time.  She indicates it is either summer or fall, and September.  She could not give me the year.  She knows she is in [place], but could not name the city.  She was correct in that she was on the second floor of our facility.  She does have occasional word finding difficulties today.  Her confrontational naming and repetition are intact.  She recalled 0/3 objects.      Visual fields are intact.  She has full extraocular motility without nystagmus.  She has a symmetric smile.  Pupils are equal, round and react well to light.  Limb strength is normal.   She has intact position and vibratory sense.  Finger-to-nose is done well.  Her gait is a bit cautious, but otherwise unremarkable.  Reflexes are 2 to 3+ and symmetric.  Plantar responses are flexor.      IMPRESSION:   1.  Alzheimer disease.   2.  Closed head injury on 03/11/2019, with traumatic subarachnoid hemorrhage.      PLAN:  I did discuss with the patient and her  daughter, Jillian, that there has been some decline in her mental status testing compared to when I saw her 6 months ago.  We discussed the pros and cons of continuing donepezil.  It is notable, however, that she initially did have a positive response to the drug.  She is tolerating it well.  She is going to continue with the donepezil.  She is also going to continue on Lexapro for anxiety.      I will be seeing her back in 6 months.         GAGE ARORA MD             D: 2019   T: 2019   MT: GIORGIO      Name:     HUBER MCDANIELS   MRN:      [id number]        Account:      [id number]   :      1945           Visit Date:   2019      Document: [id number]
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
## 2019-07-07 NOTE — NURSING NOTE
Ashli Moscoso's goals for this visit include:   Chief Complaint   Patient presents with     RECHECK     6mo recheck Alzheimer's disease        She requests these members of her care team be copied on today's visit information: no    PCP: Gauri Bolton    Referring Provider:  No referring provider defined for this encounter.    /71 (BP Location: Left arm, Patient Position: Sitting, Cuff Size: Adult Regular)   Pulse 58   Wt 72.1 kg (159 lb)   SpO2 100%   BMI 27.29 kg/m      Do you need any medication refills at today's visit? Unsure    Jessika Hess LPN     negative...

## 2019-11-06 ENCOUNTER — OFFICE VISIT (OUTPATIENT)
Dept: NEUROLOGY | Facility: CLINIC | Age: 74
End: 2019-11-06
Payer: COMMERCIAL

## 2019-11-06 VITALS
WEIGHT: 172.4 LBS | BODY MASS INDEX: 29.59 KG/M2 | OXYGEN SATURATION: 98 % | DIASTOLIC BLOOD PRESSURE: 69 MMHG | HEART RATE: 62 BPM | SYSTOLIC BLOOD PRESSURE: 111 MMHG

## 2019-11-06 DIAGNOSIS — F02.80 LATE ONSET ALZHEIMER'S DISEASE WITHOUT BEHAVIORAL DISTURBANCE (H): Primary | ICD-10-CM

## 2019-11-06 DIAGNOSIS — R25.1 TREMOR: ICD-10-CM

## 2019-11-06 DIAGNOSIS — G30.1 LATE ONSET ALZHEIMER'S DISEASE WITHOUT BEHAVIORAL DISTURBANCE (H): Primary | ICD-10-CM

## 2019-11-06 DIAGNOSIS — I10 BENIGN ESSENTIAL HYPERTENSION: ICD-10-CM

## 2019-11-06 PROCEDURE — 99213 OFFICE O/P EST LOW 20 MIN: CPT | Performed by: PSYCHIATRY & NEUROLOGY

## 2019-11-06 ASSESSMENT — PAIN SCALES - GENERAL: PAINLEVEL: NO PAIN (0)

## 2019-11-06 NOTE — NURSING NOTE
Ashli Moscoso's goals for this visit include: return  She requests these members of her care team be copied on today's visit information:     PCP: Gauri Bolton    Referring Provider:  No referring provider defined for this encounter.    /69   Pulse 62   Wt 78.2 kg (172 lb 6.4 oz)   SpO2 98%   BMI 29.59 kg/m      Do you need any medication refills at today's visit? n
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
## 2019-12-04 ENCOUNTER — OFFICE VISIT (OUTPATIENT)
Dept: PEDIATRICS | Facility: CLINIC | Age: 74
End: 2019-12-04
Attending: PSYCHIATRY & NEUROLOGY
Payer: COMMERCIAL

## 2019-12-04 VITALS
TEMPERATURE: 99.1 F | HEIGHT: 63 IN | BODY MASS INDEX: 30.39 KG/M2 | SYSTOLIC BLOOD PRESSURE: 120 MMHG | DIASTOLIC BLOOD PRESSURE: 74 MMHG | WEIGHT: 171.5 LBS | OXYGEN SATURATION: 95 % | HEART RATE: 67 BPM

## 2019-12-04 DIAGNOSIS — Z12.11 SPECIAL SCREENING FOR MALIGNANT NEOPLASMS, COLON: ICD-10-CM

## 2019-12-04 DIAGNOSIS — I10 ESSENTIAL HYPERTENSION: ICD-10-CM

## 2019-12-04 DIAGNOSIS — Z11.59 NEED FOR HEPATITIS C SCREENING TEST: ICD-10-CM

## 2019-12-04 DIAGNOSIS — Z00.00 ROUTINE GENERAL MEDICAL EXAMINATION AT A HEALTH CARE FACILITY: Primary | ICD-10-CM

## 2019-12-04 DIAGNOSIS — Z12.31 VISIT FOR SCREENING MAMMOGRAM: ICD-10-CM

## 2019-12-04 DIAGNOSIS — M81.0 OSTEOPOROSIS WITHOUT CURRENT PATHOLOGICAL FRACTURE, UNSPECIFIED OSTEOPOROSIS TYPE: ICD-10-CM

## 2019-12-04 PROCEDURE — G0438 PPPS, INITIAL VISIT: HCPCS | Performed by: FAMILY MEDICINE

## 2019-12-04 ASSESSMENT — PAIN SCALES - GENERAL: PAINLEVEL: NO PAIN (0)

## 2019-12-04 ASSESSMENT — MIFFLIN-ST. JEOR: SCORE: 1257.05

## 2019-12-04 NOTE — PROGRESS NOTES
"  SUBJECTIVE:   Ashli Moscoso is a 74 year old female who presents for Preventive Visit.  Are you in the first 12 months of your Medicare Part B coverage?  No  Patient here for establishment of care with me, a resident at the [facility].    Physical Health:    In general, how would you rate your overall physical health? good    Outside of work, how many days during the week do you exercise? 6-7 days/week    Outside of work, approximately how many minutes a day do you exercise?15-30 minutes    If you drink alcohol do you typically have >3 drinks per day or >7 drinks per week? No    Do you usually eat at least 4 servings of fruit and vegetables a day, include whole grains & fiber and avoid regularly eating high fat or \"junk\" foods? Yes    Do you have any problems taking medications regularly?  No    Do you have any side effects from medications? none    Needs assistance for the following daily activities: currently in memory care at [facility]    Which of the following safety concerns are present in your home?  none identified     Hearing impairment: Yes, [place]ol[place]    In the past 6 months, have you been bothered by leaking of urine? yes    Mental Health:    In general, how would you rate your overall mental or emotional health? good  PHQ-2 Score:      Do you feel safe in your environment? Yes    Have you ever done Advance Care Planning? (For example, a Health Directive, POLST, or a discussion with a medical provider or your loved ones about your wishes): Yes, advance care planning is on file.    Additional concerns to address?  No    Fall risk:     click delete button to remove this line now  Cognitive Screening: Not appropriate due to known dementia    Do you have sleep apnea, excessive snoring or daytime drowsiness?: no      Told had mild tremor periodically  -------------------------------------    Reviewed and updated as needed this visit by clinical staff         Reviewed and updated as " "needed this visit by Provider        Social History     Tobacco Use     Smoking status: Never Smoker     Smokeless tobacco: Never Used   Substance Use Topics     Alcohol use: No                           Current providers sharing in care for this patient include: Patient Care Team:  Gauri Bolton MD as PCP - General (Internal Medicine)  Gauri Bolton MD as Assigned PCP    The following health maintenance items are reviewed in Epic and correct as of today:  Health Maintenance   Topic Date Due     DEXA  1945     HEPATITIS C SCREENING  1945     DEPRESSION ACTION PLAN  1945     DTAP/TDAP/TD IMMUNIZATION (1 - Tdap) 06/18/1958     LIPID  06/18/1992     ZOSTER IMMUNIZATION (1 of 2) 06/18/1997     MEDICARE ANNUAL WELLNESS VISIT  06/18/2012     PNEUMOCOCCAL IMMUNIZATION 65+ LOW/MEDIUM RISK (1 of 2 - PCV13) 06/18/2012     COLONOSCOPY  05/27/2019     FALL RISK ASSESSMENT  06/28/2019     INFLUENZA VACCINE (1) 09/01/2019     MAMMO SCREENING  11/13/2019     PHQ-9  12/31/2050 (Originally 1/23/2019)     ADVANCE CARE PLANNING  03/12/2024     IPV IMMUNIZATION  Aged Out     MENINGITIS IMMUNIZATION  Aged Out           ROS:  Constitutional, HEENT, cardiovascular, pulmonary, GI, , musculoskeletal, neuro, skin, endocrine and psych systems are negative, except as otherwise noted.    OBJECTIVE:   There were no vitals taken for this visit. Estimated body mass index is 29.59 kg/m  as calculated from the following:    Height as of 4/10/19: 1.626 m (5' 4\").    Weight as of 11/6/19: 78.2 kg (172 lb 6.4 oz).  EXAM:   GENERAL: healthy, alert and no distress  EYES: Eyes grossly normal to inspection, PERRL and conjunctivae and sclerae normal  HENT: ear canals and TM's normal, nose and mouth without ulcers or lesions  NECK: no adenopathy, no asymmetry, masses, or scars and thyroid normal to palpation  RESP: lungs clear to auscultation - no rales, rhonchi or wheezes  BREAST: normal without masses, tenderness or nipple " "discharge and no palpable axillary masses or adenopathy  CV: regular rate and rhythm, normal S1 S2, no S3 or S4, no murmur, click or rub, no peripheral edema and peripheral pulses strong  ABDOMEN: soft, nontender, no hepatosplenomegaly, no masses and bowel sounds normal  MS: no gross musculoskeletal defects noted, no edema  SKIN: no suspicious lesions or rashes  NEURO: Normal strength and tone, mentation intact and speech normal  PSYCH: mentation appears normal, affect normal/bright, baseline dementia      ASSESSMENT / PLAN:   1. Routine general medical examination at a health care facility  See Counseling    2. Visit for screening mammogram  - MA SCREENING DIGITAL BILAT - Future  (s+30); Future    3. Osteoporosis without current pathological fracture, unspecified osteoporosis type  - DEXA HIP/PELVIS/SPINE - Future; Future    4. Essential hypertension  - Lipid panel reflex to direct LDL Fasting; Future  - **Comprehensive metabolic panel FUTURE anytime; Future    5. Need for hepatitis C screening test  - **Hepatitis C Screen Reflex to RNA FUTURE anytime; Future    6. Screening for colon cancer  Form filled for cologuard.    COUNSELING:  Reviewed preventive health counseling, as reflected in patient instructions       Regular exercise       Healthy diet/nutrition       Vision screening       Hearing screening       Dental care       Bladder control       Fall risk prevention       Immunizations    Declined: Influenza, TDAP and Zoster due to Concerns about side effects/safety               Osteoporosis Prevention/Bone Health       Colon cancer screening       Hepatitis C screening       HIV screening for high risk patient    Estimated body mass index is 29.59 kg/m  as calculated from the following:    Height as of 4/10/19: 1.626 m (5' 4\").    Weight as of 11/6/19: 78.2 kg (172 lb 6.4 oz).         reports that she has never smoked. She has never used smokeless tobacco.      Appropriate preventive services were discussed " with this patient, including applicable screening as appropriate for cardiovascular disease, diabetes, osteopenia/osteoporosis, and glaucoma.  As appropriate for age/gender, discussed screening for colorectal cancer, prostate cancer, breast cancer, and cervical cancer. Checklist reviewing preventive services available has been given to the patient.    Reviewed patients plan of care and provided an AVS. The Intermediate Care Plan ( asthma action plan, low back pain action plan, and migraine action plan) for Ashli meets the Care Plan requirement. This Care Plan has been established and reviewed with the Patient and daughter.    Counseling Resources:  ATP IV Guidelines  Pooled Cohorts Equation Calculator  Breast Cancer Risk Calculator  FRAX Risk Assessment  ICSI Preventive Guidelines  Dietary Guidelines for Americans, 2010  USDA's MyPlate  ASA Prophylaxis  Lung CA Screening    Martha Abel MD  [facility]

## 2019-12-04 NOTE — PATIENT INSTRUCTIONS
Preventive Health Recommendations    See your health care provider every year to    Review health changes.     Discuss preventive care.      Review your medicines if your doctor has prescribed any.      You no longer need a yearly Pap test unless you've had an abnormal Pap test in the past 10 years. If you have vaginal symptoms, such as bleeding or discharge, be sure to talk with your provider about a Pap test.      Every 1 to 2 years, have a mammogram.  If you are over 69, talk with your health care provider about whether or not you want to continue having screening mammograms.      Every 10 years, have a colonoscopy. Or, have a yearly FIT test (stool test). These exams will check for colon cancer.       Have a cholesterol test every 5 years, or more often if your doctor advises it.       Have a diabetes test (fasting glucose) every three years. If you are at risk for diabetes, you should have this test more often.       At age 65, have a bone density scan (DEXA) to check for osteoporosis (brittle bone disease).    Shots:    Get a flu shot each year.    Get a tetanus shot every 10 years.    Talk to your doctor about your pneumonia vaccines. There are now two you should receive - Pneumovax (PPSV 23) and Prevnar (PCV 13).    Talk to your pharmacist about the shingles vaccine.    Talk to your doctor about the hepatitis B vaccine.    Nutrition:     Eat at least 5 servings of fruits and vegetables each day.      Eat whole-grain bread, whole-wheat pasta and brown rice instead of white grains and rice.      Get adequate about Calcium and Vitamin D.     Lifestyle    Exercise at least 150 minutes a week (30 minutes a day, 5 days a week). This will help you control your weight and prevent disease.      Limit alcohol to one drink per day.      No smoking.       Wear sunscreen to prevent skin cancer.       See your dentist twice a year for an exam and cleaning.      See your eye doctor every 1 to 2 years to screen for  conditions such as glaucoma, macular degeneration, cataracts, etc.    Personalized Prevention Plan  You are due for the preventive services outlined below.  Your care team is available to assist you in scheduling these services.  If you have already completed any of these items, please share that information with your care team to update in your medical record.    Health Maintenance Due   Topic Date Due     Osteoporosis Screening  1945     Hepatitis C Screening  1945     Depression Action Plan  1945     Diptheria Tetanus Pertussis (DTAP/TDAP/TD) Vaccine (1 - Tdap) 06/18/1958     Cholesterol Lab  06/18/1992     Zoster (Shingles) Vaccine (1 of 2) 06/18/1997     Annual Wellness Visit  06/18/2012     Pneumococcal Vaccine (1 of 2 - PCV13) 06/18/2012     Colonoscopy  05/27/2019     FALL RISK ASSESSMENT  06/28/2019     Flu Vaccine (1) 09/01/2019     Mammogram  11/13/2019

## 2019-12-07 PROBLEM — F33.9 EPISODE OF RECURRENT MAJOR DEPRESSIVE DISORDER, UNSPECIFIED DEPRESSION EPISODE SEVERITY (H): Status: RESOLVED | Noted: 2018-05-24 | Resolved: 2019-12-07

## 2019-12-17 ENCOUNTER — TELEPHONE (OUTPATIENT)
Dept: PEDIATRICS | Facility: CLINIC | Age: 74
End: 2019-12-17

## 2019-12-17 NOTE — TELEPHONE ENCOUNTER
Called daughter, she states they need a written order. Faxed this encounter to [facility].  Ph- [phone].  Sepideh Sanders RN

## 2019-12-17 NOTE — TELEPHONE ENCOUNTER
12.17.19    [facility]  Fax: [phone]  Attn: Davis Desk      COLOGUARD stool kit was given to patient at 12.4.19 appt with Dr Abel.   [facility] needs an order from the doctor to be able to help patient with the kit.      Please make sure pt's name is on order.       Any question please call daughter, Idie  [phone]

## 2019-12-18 ENCOUNTER — TRANSFERRED RECORDS (OUTPATIENT)
Dept: HEALTH INFORMATION MANAGEMENT | Facility: CLINIC | Age: 74
End: 2019-12-18

## 2019-12-18 LAB — COLOGUARD-ABSTRACT: POSITIVE

## 2020-01-03 ENCOUNTER — TELEPHONE (OUTPATIENT)
Dept: FAMILY MEDICINE | Facility: CLINIC | Age: 75
End: 2020-01-03

## 2020-01-03 NOTE — TELEPHONE ENCOUNTER
Called daughter, relayed message & patient verbalized understanding. Daughter states she has bad anxiety and couldn't complete the prep. Now that she has Alzheimers, she is unsure how to proceed and wonders what is recommended for patients with Alzheimers. She sees Dr. Starkey for neurology.   Sepideh Sanders RN

## 2020-01-03 NOTE — TELEPHONE ENCOUNTER
Please notify patient or patient's daughter of positive Cologuard test. Next step will be moving forward with a colonoscopy to determine if positive result is due to a polyps, hemorrhoid, diverticulosis or colon cancer. If agreeable then referral for colonoscopy.

## 2020-01-03 NOTE — TELEPHONE ENCOUNTER
Mom's impairment isn't that severe but I remember patient telling me that she wasn't going through with a colonoscopy due to the prep. I guess we have to respect her wishes.

## 2020-01-03 NOTE — TELEPHONE ENCOUNTER
Called Idie, relayed message & patient verbalized understanding. Mailed results to daughter per request.  Sepideh Sanders RN

## 2020-01-07 NOTE — TELEPHONE ENCOUNTER
Daughter, Idie, calls back wondering if there were other prep options, medication questions, if she can take her usual meds and how long a colonoscopy normally takes. Instructed her to call GI and gave her the scheduling number.  Mammogram and dexa are scheduled next week.   Sepideh Sanders RN

## 2020-02-19 ENCOUNTER — OFFICE VISIT (OUTPATIENT)
Dept: NEUROLOGY | Facility: CLINIC | Age: 75
End: 2020-02-19
Payer: COMMERCIAL

## 2020-02-19 VITALS
BODY MASS INDEX: 30.47 KG/M2 | OXYGEN SATURATION: 99 % | HEART RATE: 70 BPM | WEIGHT: 172 LBS | DIASTOLIC BLOOD PRESSURE: 77 MMHG | SYSTOLIC BLOOD PRESSURE: 130 MMHG

## 2020-02-19 DIAGNOSIS — F02.80 ALZHEIMER'S DEMENTIA WITHOUT BEHAVIORAL DISTURBANCE, UNSPECIFIED TIMING OF DEMENTIA ONSET: Primary | ICD-10-CM

## 2020-02-19 DIAGNOSIS — G30.9 ALZHEIMER'S DEMENTIA WITHOUT BEHAVIORAL DISTURBANCE, UNSPECIFIED TIMING OF DEMENTIA ONSET: Primary | ICD-10-CM

## 2020-02-19 DIAGNOSIS — R25.1 TREMOR: ICD-10-CM

## 2020-02-19 PROCEDURE — 99213 OFFICE O/P EST LOW 20 MIN: CPT | Performed by: PSYCHIATRY & NEUROLOGY

## 2020-02-19 ASSESSMENT — PAIN SCALES - GENERAL: PAINLEVEL: MILD PAIN (2)

## 2020-02-19 NOTE — NURSING NOTE
Ashli Moscoso's goals for this visit include:   Chief Complaint   Patient presents with     RECHECK     3mo recheck Late onset Alzheimer's disease/ Daughter concerned with new issues       She requests these members of her care team be copied on today's visit information: no    PCP: Gauri Bolton    Referring Provider:  Carrillo Merchant MD  [address] [place] [address]    /77 (BP Location: Right arm, Patient Position: Sitting, Cuff Size: Adult Regular)   Pulse 70   Wt 78 kg (172 lb)   SpO2 99%   BMI 30.47 kg/m      Do you need any medication refills at today's visit? Unsure    Jessika Hess LPN

## 2020-02-19 NOTE — LETTER
"    2/19/2020         RE: Ashli Moscoso  C/o Jillian [facility]  [address] [place] [address]        Dear Colleague,    Thank you for referring your patient, Ashli Moscoso, to the [facility]. Please see a copy of my visit note below.    Visit Date:   02/19/2020      HISTORY OF PRESENT ILLNESS:  Ashli Moscoso returns for followup of Alzheimer's disease and tremor.  She is accompanied by her daughter, Jillian.        Jillian is concerned that her mother's memory is declining.  She cites a couple of examples.  On one occasion, Jillian's  fixed her television and the patient commented upon that, walked into another room, and completely forgot that she even noticed this.  The patient also reports being tired.  Jillian indicates that at times she is sad and crying, but the patient indicates she is not depressed.  She started seeing a behavioral therapist.  She is on Lexapro but does not want to increase the dose further.      She continues on donepezil 10 mg as well.      PHYSICAL EXAMINATION:   GENERAL:  She is calm, alert and cooperative.   VITAL SIGNS:  Heart rate 70.  Blood pressure 130/77.      MENTAL STATUS EXAMINATION:  She again is disoriented to place and time.  She indicates she is in [place], but does know she is at a doctor's office in [place] and on the second floor.  She is aware that it is winter, but does not get the month correct nor the day of the week, and she indicates the year as 1000.  Her spontaneous speech is normal.  Naming and repetition are normal.  She could spell the word \"world\" forward and backward accurately.  She recalls 0/3 objects.      Today, she does have an intermittent rest tremor of the left hand.  She has a minimal postural tremor.  Her muscle tone is normal and there is no cogwheeling.  She ambulates with a limp but otherwise her gait is stable.      IMPRESSION:   1.  Alzheimer's disease.   2.  Possible depression, although the patient denies this.   3.  Tremor " with mild left hand rest tremor.      PLAN:  Idie wonders about increasing the donepezil dose further.  I told her generally an increase in dose does not lead to a great improvement and is counterbalanced by increase in side effects.  She is going to continue on 10 mg of donepezil a day.      We discussed increasing her Lexapro dose, but the patient does not want to do so and denies being depressed.  She is, however, interested in continuing with behavioral therapy, which has been started at the [facility].      I am going to continue to monitor her tremor.  I do not think any pharmacologic intervention is warranted at this time.      I will be seeing her back in 3 months.         CARRILLO MERCHANT MD             D: 2020   T: 2020   MT: REGINALD      Name:     HUBER MCDANIELS   MRN:      [id number]        Account:      [id number]   :      1945           Visit Date:   2020      Document: [id number]        Again, thank you for allowing me to participate in the care of your patient.        Sincerely,        Carrillo Merchant MD

## 2020-02-19 NOTE — PROGRESS NOTES
"Visit Date:   02/19/2020      HISTORY OF PRESENT ILLNESS:  Ashli Moscoso returns for followup of Alzheimer's disease and tremor.  She is accompanied by her daughter, Jillian.        Jillian is concerned that her mother's memory is declining.  She cites a couple of examples.  On one occasion, Jillian's  fixed her television and the patient commented upon that, walked into another room, and completely forgot that she even noticed this.  The patient also reports being tired.  Jillian indicates that at times she is sad and crying, but the patient indicates she is not depressed.  She started seeing a behavioral therapist.  She is on Lexapro but does not want to increase the dose further.      She continues on donepezil 10 mg as well.      PHYSICAL EXAMINATION:   GENERAL:  She is calm, alert and cooperative.   VITAL SIGNS:  Heart rate 70.  Blood pressure 130/77.      MENTAL STATUS EXAMINATION:  She again is disoriented to place and time.  She indicates she is in [place], but does know she is at a doctor's office in [place] and on the second floor.  She is aware that it is winter, but does not get the month correct nor the day of the week, and she indicates the year as 1000.  Her spontaneous speech is normal.  Naming and repetition are normal.  She could spell the word \"world\" forward and backward accurately.  She recalls 0/3 objects.      Today, she does have an intermittent rest tremor of the left hand.  She has a minimal postural tremor.  Her muscle tone is normal and there is no cogwheeling.  She ambulates with a limp but otherwise her gait is stable.      IMPRESSION:   1.  Alzheimer's disease.   2.  Possible depression, although the patient denies this.   3.  Tremor with mild left hand rest tremor.      PLAN:  Jillian wonders about increasing the donepezil dose further.  I told her generally an increase in dose does not lead to a great improvement and is counterbalanced by increase in side effects.  She is going to continue " on 10 mg of donepezil a day.      We discussed increasing her Lexapro dose, but the patient does not want to do so and denies being depressed.  She is, however, interested in continuing with behavioral therapy, which has been started at the [facility].      I am going to continue to monitor her tremor.  I do not think any pharmacologic intervention is warranted at this time.      I will be seeing her back in 3 months.         GAGE ARORA MD             D: 2020   T: 2020   MT: REGINALD      Name:     HUBER MCDANIELS   MRN:      [id number]        Account:      [id number]   :      1945           Visit Date:   2020      Document: [id number]

## 2020-05-13 ENCOUNTER — TELEPHONE (OUTPATIENT)
Dept: NEUROLOGY | Facility: CLINIC | Age: 75
End: 2020-05-13

## 2020-05-13 NOTE — TELEPHONE ENCOUNTER
Left message with daughter, she is in a memory care facility and she will find out if she can do a video visit

## 2020-05-14 NOTE — TELEPHONE ENCOUNTER
The pt's daughter called back to see if it was possible for the memory care center to fax over an updated list of her medications instead of doing it over the phone before the appt on 5/20. She was given the fax number [phone].   They will watch for the link that Dr Merchant sends to them and wanted Ronald to know that he doesn't have to call beforehand.    Marika Santamaria, RNCC  Neurology

## 2020-05-20 ENCOUNTER — VIRTUAL VISIT (OUTPATIENT)
Dept: NEUROLOGY | Facility: CLINIC | Age: 75
End: 2020-05-20
Payer: COMMERCIAL

## 2020-05-20 DIAGNOSIS — F02.80 ALZHEIMER'S DEMENTIA WITHOUT BEHAVIORAL DISTURBANCE, UNSPECIFIED TIMING OF DEMENTIA ONSET: Primary | ICD-10-CM

## 2020-05-20 DIAGNOSIS — G30.9 ALZHEIMER'S DEMENTIA WITHOUT BEHAVIORAL DISTURBANCE, UNSPECIFIED TIMING OF DEMENTIA ONSET: Primary | ICD-10-CM

## 2020-05-20 PROCEDURE — 99215 OFFICE O/P EST HI 40 MIN: CPT | Mod: 95 | Performed by: PSYCHIATRY & NEUROLOGY

## 2020-05-20 RX ORDER — POTASSIUM CHLORIDE 1500 MG/1
20 TABLET, EXTENDED RELEASE ORAL 2 TIMES DAILY
COMMUNITY

## 2020-05-20 RX ORDER — QUETIAPINE FUMARATE 50 MG/1
25 TABLET, EXTENDED RELEASE ORAL AT BEDTIME
COMMUNITY
End: 2020-05-20

## 2020-05-20 RX ORDER — QUETIAPINE FUMARATE 25 MG/1
12.5 TABLET, FILM COATED ORAL 2 TIMES DAILY
COMMUNITY

## 2020-05-20 RX ORDER — MEMANTINE HYDROCHLORIDE 5 MG/1
TABLET ORAL
Qty: 60 TABLET | Refills: 3 | Status: SHIPPED | OUTPATIENT
Start: 2020-05-20 | End: 2020-06-25

## 2020-05-20 NOTE — PROGRESS NOTES
"Ashli Moscoso is a 74 year old female who is being evaluated via a billable video visit.      The patient has been notified of following:     \"This video visit will be conducted via a call between you and your physician/provider. We have found that certain health care needs can be provided without the need for an in-person physical exam.  This service lets us provide the care you need with a video conversation.  If a prescription is necessary we can send it directly to your pharmacy.  If lab work is needed we can place an order for that and you can then stop by our lab to have the test done at a later time.    Video visits are billed at different rates depending on your insurance coverage.  Please reach out to your insurance provider with any questions.    If during the course of the call the physician/provider feels a video visit is not appropriate, you will not be charged for this service.\"    Patient has given verbal consent for Video visit?     How would you like to obtain your AVS?     Patient would like the video invitation sent by: Send to e-mail at:[email or web address]   Will anyone else be joining your video visit? Yes: Nurse. Gamble How would they like to receive their invitation? Send to [email or web address]       Video-Visit Details    Type of service:  Video Visit    Video Start Time: 4:32  Video End Time: 5:12    Originating Location (pt. Location): Long term Care    Distant Location (provider location):  [facility]     Platform used for Video Visit: AmWell  and Doximity        "

## 2020-06-25 ENCOUNTER — VIRTUAL VISIT (OUTPATIENT)
Dept: NEUROLOGY | Facility: CLINIC | Age: 75
End: 2020-06-25
Payer: COMMERCIAL

## 2020-06-25 DIAGNOSIS — F02.80 ALZHEIMER'S DEMENTIA WITHOUT BEHAVIORAL DISTURBANCE, UNSPECIFIED TIMING OF DEMENTIA ONSET: ICD-10-CM

## 2020-06-25 DIAGNOSIS — G30.9 ALZHEIMER'S DEMENTIA WITHOUT BEHAVIORAL DISTURBANCE, UNSPECIFIED TIMING OF DEMENTIA ONSET: ICD-10-CM

## 2020-06-25 PROCEDURE — 99215 OFFICE O/P EST HI 40 MIN: CPT | Mod: 95 | Performed by: PSYCHIATRY & NEUROLOGY

## 2020-06-25 RX ORDER — MEMANTINE HYDROCHLORIDE 5 MG/1
TABLET ORAL
Qty: 120 TABLET | Refills: 3 | Status: SHIPPED | OUTPATIENT
Start: 2020-06-25

## 2020-06-25 NOTE — NURSING NOTE
Signed order from Dr Merchant for Namenda faxed to [facility] at [phone] with confirmation.   DBajustina, RN

## 2020-06-25 NOTE — LETTER
6/25/2020         RE: Ashli Moscoso  C/o Jillian Perez  [address] [place] [address]        Dear Colleague,    Thank you for referring your patient, Ashli Moscoso, to the [facility]. Please see a copy of my visit note below.    Visit Date:   06/25/2020      This is a virtual video followup visit.  The visit is being done virtually because of the COVID-19 pandemic.      Ashli Moscoso is a patient with Alzheimer's disease.  She currently resides in a care center.  On the visit, her daughter, Mary Beth , nurse Leonela, and the patient.      Mary Beth does indicate that the patient tends to get worse later the day (sundowning).  She feels that some people are getting mad at her, something she has done.  She at times is sad and cries.      When I saw her a month ago, it was decided to add Namenda, and she is taking 5 mg twice a day.  While she has not had any definite side effects from the drug, it has not had any demonstrable impact on her at this dose.      She continues on donepezil 10 mg as well as escitalopram 10 mg and quetiapine 25 mg at bedtime.      PHYSICAL EXAMINATION:  Examination of the patient was very limited.  She was alert.  She recognized me as Dr. Merchant.  She was complaining that she had come out of the bathroom and there were somebody on the floor.  The nurse checked, and this was not accurate.      IMPRESSION:  Alzheimer disease.      PLAN:  I am going to try and push the Namenda dose up to the target of 10 mg twice a day.  She will go to 15 mg a day for a week and then 10 mg twice a day.  I am uncertain if this is going to have a major impact or not.  I did, however, tell her nurse Leonela if the patient seems to be coming more upset and more confused or having more delusions coincident with increasing the Namenda dose, that they need to contact me, and at that point, I will discontinue the drug.      She is going to continue on donepezil 10 mg.      We discussed the pros and  cons of increasing her Seroquel dose.  I pointed out to Idieid that antipsychotic drugs have been associated with increased mortality in patients with dementia, but sometimes, depending on the severity of the patient's symptoms, they are necessary.  For now, the Seroquel dose is not going to be increased further.      I have recommended a followup evaluation in 6 weeks.      Total visit time was 45 minutes, from 4:27 p.m. until 5:12 p.m.  A combination of AmWell and Doximity were utilized for the visit.         GAGE MERCHANT MD             D: 2020   T: 2020   MT: DARLIN      Name:     HUBER MCDANIELS   MRN:      [id number]        Account:      [id number]   :      1945           Visit Date:   2020      Document: [id number]       Again, thank you for allowing me to participate in the care of your patient.        Sincerely,        Gage Merchant MD

## 2020-06-26 NOTE — PROGRESS NOTES
Visit Date:   06/25/2020      This is a virtual video followup visit.  The visit is being done virtually because of the COVID-19 pandemic.      Ashli Moscoso is a patient with Alzheimer's disease.  She currently resides in a care center.  On the visit, her daughter, Mary Beth , nurse Leonela, and the patient.      Idieid does indicate that the patient tends to get worse later the day (sundowning).  She feels that some people are getting mad at her, something she has done.  She at times is sad and cries.      When I saw her a month ago, it was decided to add Namenda, and she is taking 5 mg twice a day.  While she has not had any definite side effects from the drug, it has not had any demonstrable impact on her at this dose.      She continues on donepezil 10 mg as well as escitalopram 10 mg and quetiapine 25 mg at bedtime.      PHYSICAL EXAMINATION:  Examination of the patient was very limited.  She was alert.  She recognized me as Dr. Merchant.  She was complaining that she had come out of the bathroom and there were somebody on the floor.  The nurse checked, and this was not accurate.      IMPRESSION:  Alzheimer disease.      PLAN:  I am going to try and push the Namenda dose up to the target of 10 mg twice a day.  She will go to 15 mg a day for a week and then 10 mg twice a day.  I am uncertain if this is going to have a major impact or not.  I did, however, tell her nurse Leonela if the patient seems to be coming more upset and more confused or having more delusions coincident with increasing the Namenda dose, that they need to contact me, and at that point, I will discontinue the drug.      She is going to continue on donepezil 10 mg.      We discussed the pros and cons of increasing her Seroquel dose.  I pointed out to Mary Beth that antipsychotic drugs have been associated with increased mortality in patients with dementia, but sometimes, depending on the severity of the patient's symptoms, they are necessary.  For now,  the Seroquel dose is not going to be increased further.      I have recommended a followup evaluation in 6 weeks.      Total visit time was 45 minutes, from 4:27 p.m. until 5:12 p.m.  A combination of AmWell and Doximity were utilized for the visit.         GAGE ARORA MD             D: 2020   T: 2020   MT: DARLIN      Name:     HUBER MCDANIELS   MRN:      [id number]        Account:      [id number]   :      1945           Visit Date:   2020      Document: [id number]

## 2020-08-12 ENCOUNTER — OFFICE VISIT (OUTPATIENT)
Dept: NEUROLOGY | Facility: CLINIC | Age: 75
End: 2020-08-12
Payer: COMMERCIAL

## 2020-08-12 VITALS
HEART RATE: 67 BPM | OXYGEN SATURATION: 99 % | BODY MASS INDEX: 30.47 KG/M2 | SYSTOLIC BLOOD PRESSURE: 139 MMHG | DIASTOLIC BLOOD PRESSURE: 84 MMHG | WEIGHT: 172 LBS

## 2020-08-12 DIAGNOSIS — G30.1 LATE ONSET ALZHEIMER'S DISEASE WITH BEHAVIORAL DISTURBANCE (H): Primary | ICD-10-CM

## 2020-08-12 DIAGNOSIS — F02.818 LATE ONSET ALZHEIMER'S DISEASE WITH BEHAVIORAL DISTURBANCE (H): Primary | ICD-10-CM

## 2020-08-12 DIAGNOSIS — G25.2 RESTING TREMOR: ICD-10-CM

## 2020-08-12 PROCEDURE — 99213 OFFICE O/P EST LOW 20 MIN: CPT | Performed by: PSYCHIATRY & NEUROLOGY

## 2020-08-13 NOTE — PROGRESS NOTES
"Visit Date:   08/12/2020      Ashli Moscoso returns for followup of Alzheimer disease.  She is accompanied by her daughter Jillian and son Charan today.      She was having a great deal of difficulty when I did a video visit back in June.  She was \"sundowning.\"  She was having some elements of delusions and paranoia at that time.      Since then, she has increased her Namenda dose to 10 mg twice a day.  Her quetiapine dose has been changed from 25 mg at night to 12.5 mg twice a day.  She has continued on escitalopram.  A couple of medications have been eliminated, including niacin and fenofibrate.      Overall, she seems better.  She is tired in the evenings, but her attitude is better, and she is not getting agitated, which is definitely an improvement.      PHYSICAL EXAMINATION:   GENERAL:  Exam today reveals she is pleasant and cooperative.   VITAL SIGNS:  Heart rate 67.  Blood pressure 139/84.   MENTAL STATUS:  She recognizes me and gets my name correct.  She knows she is at my office, but cannot give me the exact city.  She does know she is in [place].  She is totally disoriented to date.  She cannot give me the season.  She does well with confrontational naming and repetition.  She recalls 0/3 objects.     NEUROLOGIC:  She does have a low amplitude rest tremor of the right hand intermittently.  There is no change in her tone.  She ambulates with a fairly stable gait, although she does have a reduction of her right arm swing.      IMPRESSION:   1.  Alzheimer disease.   2.  Right hand tremor.      PLAN:  Overall, she appears to be better in terms of agitation since the medication changes were made.  I am not adding anything today.      I did note the tremor today.  I noted a postural tremor in the past, but not a rest tremor.  She does not have any other prominent features of Parkinson disease, and I certainly would not start her on a medication for such, in view of her cognitive issues.  I will just continue to " monitor this.      I am going to see her back in 6 months.         GAGE ARORA MD             D: 2020   T: 2020   MT: DARLIN      Name:     HUBER MCDANIELS   MRN:      [id number]        Account:      [id number]   :      1945           Visit Date:   2020      Document: [id number]

## 2020-11-24 NOTE — PROGRESS NOTES
"Visit Date:   2020      This is a virtual video followup visit.  The visit is being done virtually because of the COVID-19 pandemic.  The patient's daughter Jillian was contacted via [employer].  [employer] was used to contact the patient at her care center.      Ashli Moscoso is a patient with Alzheimer's disease.  She currently resides at [facility], on the memory care unit.      Before I was able to contact the patient, I did get a chance to talk to Jillian.  She indicates her mother's memory is worsening.  This is particularly in the afternoon.  She has isolated now because of the COVID-19 pandemic which is likely problematic.  Jillian indicates the patient states she has gone places where she has not.  She has conversations at times that do not make sense.  She also on one occasion and indicated she had had lunch with an uncle who had long since .      I did discuss issues with her nurse, Leonela, who was also present.  She indicates that Ashli generally is sufficient in her self-cares.  She was having a lot of problems with being upset and crying at night and I note that Seroquel 25 mg was added by the facility provider at bedtime and this seems to have helped that and the patient indicates she feels \"better.\"  She tells me she is pretty good and doing fine.  She admitted at that times she is a little bit touchy.  She continues on donepezil 10 mg.        MEDICATIONS:   Her medication list was reviewed and includes:   1.  Donepezil.   2.  Escitalopram 10 mg.   3.  Fenofibrate.   4.  Iron.   5.  Hydrochlorothiazide.   6.  Lisinopril.   7.  Claritin.   8.  Meclizine that she really has not needed to take.   9.  Metoprolol   10.  Potassium.   11.  Simvastatin.   12.  Quetiapine 25 mg.      PHYSICAL EXAMINATION:   MENTAL STATUS:  This is a patient who is very pleasant.  She recognizes me and smiles.  She knows she resides at [facility].  She cannot give me the date at all.  She recalled 0/3 objects.  She did " repeat questions concerning things that I already talked to her about.  I had to remind her about the COVID-19 pandemic.      IMPRESSION:  Alzheimer disease.      PLAN:  We did discuss adding Namenda.  I told the patient and her daughter that I cannot guarantee it is going to have any positive impact, but it is generally a well-tolerated drug.  It was decided to move forward with that.      She is going to start on Namenda 5 mg a day for a week and then go to 5 mg twice a day and hold at that dose.  I should note she had normal renal function in the past.      She is going to continue on donepezil 10 mg.      At the conclusion of my visit with the patient, I spent some more time talking to Idie about prognosis.  Unfortunately, her mother has a progressive illness for which there is no specific treatment to stop the progression.  I would be reluctant to stop the donepezil as in some cases discontinuing the drug can result in a more precipitous decline and it is notable that she did respond when it was initiated.      I plan to follow up with the patient in a month.      Total visit time was 40 minutes from 4:32 p.m. until 5:12 p.m.  Both Amwell and Todd were utilized for this visit.         GAGE ARORA MD             D: 2020   T: 2020   MT: BENNY      Name:     HUBER MCDANIELS   MRN:      [id number]        Account:      [id number]   :      1945           Visit Date:   2020      Document: [id number]       alert

## 2021-02-11 ENCOUNTER — TELEPHONE (OUTPATIENT)
Dept: NEUROLOGY | Facility: CLINIC | Age: 76
End: 2021-02-11

## 2021-02-11 ENCOUNTER — OFFICE VISIT (OUTPATIENT)
Dept: NEUROLOGY | Facility: CLINIC | Age: 76
End: 2021-02-11
Payer: COMMERCIAL

## 2021-02-11 VITALS
HEIGHT: 63 IN | SYSTOLIC BLOOD PRESSURE: 145 MMHG | DIASTOLIC BLOOD PRESSURE: 80 MMHG | HEART RATE: 67 BPM | OXYGEN SATURATION: 99 % | BODY MASS INDEX: 27.89 KG/M2 | WEIGHT: 157.4 LBS

## 2021-02-11 DIAGNOSIS — F02.818 LATE ONSET ALZHEIMER'S DISEASE WITH BEHAVIORAL DISTURBANCE (H): Primary | ICD-10-CM

## 2021-02-11 DIAGNOSIS — G30.1 LATE ONSET ALZHEIMER'S DISEASE WITH BEHAVIORAL DISTURBANCE (H): Primary | ICD-10-CM

## 2021-02-11 DIAGNOSIS — G25.2 RESTING TREMOR: ICD-10-CM

## 2021-02-11 PROCEDURE — 99213 OFFICE O/P EST LOW 20 MIN: CPT | Performed by: PSYCHIATRY & NEUROLOGY

## 2021-02-11 ASSESSMENT — MIFFLIN-ST. JEOR: SCORE: 1188.09

## 2021-02-11 ASSESSMENT — PAIN SCALES - GENERAL: PAINLEVEL: NO PAIN (0)

## 2021-02-11 NOTE — LETTER
"    2021         RE: Ashli Moscoso  C/o Jillian Perez  [address] [place] [address]        Dear Colleague,    Thank you for referring your patient, Ashli Moscoso, to the [facility] [place]. Please see a copy of my visit note below.    Ashli Moscoso's goals for this visit include: Follow up   She requests these members of her care team be copied on today's visit information:     PCP: No Ref-Primary, Physician    Referring Provider:  No referring provider defined for this encounter.    BP (!) 145/80   Pulse 67   Ht 1.6 m (5' 3\")   Wt 71.4 kg (157 lb 6.4 oz)   SpO2 99%   BMI 27.88 kg/m      Do you need any medication refills at today's visit? No    [facility]      Visit Date:   2021            Casey Flynn NP   [address], [facility] [address]   [place] [address]      Patient:  Ashli Moscoso   MRN:  [id number]   :  1945      Dear Casey,      I saw Ashli Moscoso for followup today.  Thank you for contacting the clinic with your observations.  As you know, she is a patient with Alzheimer's disease.  She also has a rest tremor.  She is accompanied by her daughter, Mell, and son, Charan, as usual.      She is residing at [facility].  It seems like she is doing reasonably well in this setting.  It does not seem like she is getting agitated as she was now that she is on Seroquel.  You noted that this tends to occur if she is taken out of the memory care unit for appointments and you wondered about limiting her visits because of this.  I did share this information with Mell, her daughter.      She continues on donepezil 10 mg a day, Namenda 10 mg twice a day and Seroquel 12.5 mg twice a day.  She has been switched now from escitalopram to sertraline 50 mg.      PHYSICAL EXAMINATION:   GENERAL:  Examination reveals she is pleasant and cooperative.   VITAL SIGNS:  Heart rate 67.  Blood " "pressure 145/80.   NEUROLOGIC:  She knows she is at a doctor's office and she knew my name, but she is not oriented to the city.  She does know the state.  She is fully disoriented to time.  She indicates it is either summer or fall and the month is July.  Her spontaneous speech is normal.  She is able to name and repeat.  She could spell the word \"world\" backwards.  She recalled 0/3 objects.      She does have an intermittent rest tremor of the right and the left hand.  There is mild cogwheeling in the left upper extremity.  She gets up and ambulates with a stable gait.  She has a reduction of her arm swing on the left and an intermittent tremor of the right hand.      IMPRESSION:   1.  Alzheimer disease.   2.  Rest tremor -- possible early Parkinson's.      PLAN:  I am not making any change in her medications.      I would not start her on any medication for parkinsonism at this point as the treatment might be worse than the symptoms at this time.  I did discuss this with them.      I would be open to just seeing Ms. Moscoso on an as-needed basis as you suggested and I did discuss this with the patient and her children.  I would always be happy to see her back.  I did tell them that I am retiring at the end of  and if she followed up at this clinic, it would be with Dr. Antelmo Mauro at that point and I highly recommended Dr. Mauro.      Total visit time 20 minutes.         CARRILLO ARORA MD             D: 2021   T: 2021   MT: PK      Name:     HUBER MOSCOSO   MRN:      [id number]        Account:      [id number]   :      1945           Visit Date:   2021      Document: [id number]       cc: YUMIKO SHEPHERD NP         Again, thank you for allowing me to participate in the care of your patient.        Sincerely,        Carrillo Arora MD    "

## 2021-02-11 NOTE — TELEPHONE ENCOUNTER
RN received a call from Casey Flynn NP at [facility]. He wanted Dr Merchant to know that Ashli has declined since her last visit with him.   She has started to exhibit behavioral issues when she is taken out of the memory care for appts.   She is still is on Aricept and Namenda and tolerating it well with no adverse side effects but has started her on Seroquel 12.5mg twice daily. At this time, he would prefer to keep her at that dose unless her behaviors become more pronounced. He feels they are triggered mostly in unfamiliar situations. With that being said he feels it may be a good idea to keep all of her care in-house and not go out for doctors appt to avoid these outbursts.He is open to Dr Merchant's thoughts.   He wanted to pass this information along before he sees her today. The family is on board his suggestion.    Marika Santamaria RNCC  Neurology

## 2021-02-11 NOTE — LETTER
"2021       RE: Ashli Moscoso  C/o Jillian Perez  [address] [place] [address]     Dear Colleague,    Thank you for referring your patient, Ashli Moscoso, to the [facility] at [facility]. Please see a copy of my visit note below.    Ashli Moscoso's goals for this visit include: Follow up   She requests these members of her care team be copied on today's visit information:     PCP: No Ref-Primary, Physician    Referring Provider:  No referring provider defined for this encounter.    BP (!) 145/80   Pulse 67   Ht 1.6 m (5' 3\")   Wt 71.4 kg (157 lb 6.4 oz)   SpO2 99%   BMI 27.88 kg/m      Do you need any medication refills at today's visit? No    Antoinette Schaefer [facility]      Visit Date:   2021            Casey Flynn NP   [facility]   [address] [place] [address], [facility] [address]   [place] [address]      Patient:  Ashli Moscoso   MRN:  [id number]   :  1945      Dear Casey,      I saw Ashli Moscoso for followup today.  Thank you for contacting the clinic with your observations.  As you know, she is a patient with Alzheimer's disease.  She also has a rest tremor.  She is accompanied by her daughter, Mell, and son, Charan, as usual.      She is residing at [facility].  It seems like she is doing reasonably well in this setting.  It does not seem like she is getting agitated as she was now that she is on Seroquel.  You noted that this tends to occur if she is taken out of the memory care unit for appointments and you wondered about limiting her visits because of this.  I did share this information with Mell, her daughter.      She continues on donepezil 10 mg a day, Namenda 10 mg twice a day and Seroquel 12.5 mg twice a day.  She has been switched now from escitalopram to sertraline 50 mg.      PHYSICAL EXAMINATION:   GENERAL:  Examination reveals she is pleasant and " "cooperative.   VITAL SIGNS:  Heart rate 67.  Blood pressure 145/80.   NEUROLOGIC:  She knows she is at a doctor's office and she knew my name, but she is not oriented to the city.  She does know the state.  She is fully disoriented to time.  She indicates it is either summer or fall and the month is July.  Her spontaneous speech is normal.  She is able to name and repeat.  She could spell the word \"world\" backwards.  She recalled 0/3 objects.      She does have an intermittent rest tremor of the right and the left hand.  There is mild cogwheeling in the left upper extremity.  She gets up and ambulates with a stable gait.  She has a reduction of her arm swing on the left and an intermittent tremor of the right hand.      IMPRESSION:   1.  Alzheimer disease.   2.  Rest tremor -- possible early Parkinson's.      PLAN:  I am not making any change in her medications.      I would not start her on any medication for parkinsonism at this point as the treatment might be worse than the symptoms at this time.  I did discuss this with them.      I would be open to just seeing Ms. Moscoso on an as-needed basis as you suggested and I did discuss this with the patient and her children.  I would always be happy to see her back.  I did tell them that I am retiring at the end of  and if she followed up at this clinic, it would be with Dr. Antelmo Mauro at that point and I highly recommended Dr. Mauro.      Total visit time 20 minutes.         CARRILLO ARORA MD             D: 2021   T: 2021   MT: PK      Name:     HUBER MOSCOSO   MRN:      [id number]        Account:      [id number]   :      1945           Visit Date:   2021      Document: [id number]       cc: YUMIKO SHEPHERD NP         Again, thank you for allowing me to participate in the care of your patient.      Sincerely,    Carrillo Arora MD    "

## 2021-02-11 NOTE — PROGRESS NOTES
"Ashli Moscoso's goals for this visit include: Follow up   She requests these members of her care team be copied on today's visit information:     PCP: No Ref-Primary, Physician    Referring Provider:  No referring provider defined for this encounter.    BP (!) 145/80   Pulse 67   Ht 1.6 m (5' 3\")   Wt 71.4 kg (157 lb 6.4 oz)   SpO2 99%   BMI 27.88 kg/m      Do you need any medication refills at today's visit? No    Antoinette Schaefer CMA    "

## 2021-02-12 NOTE — PROGRESS NOTES
"Visit Date:   2021            Casey Flynn NP   [facility]   [address], [address]   [place] [address]      Patient:  Ashli Moscoso   MRN:  [id number]   :  1945      Dear Casey,      I saw Ashli Moscoso for followup today.  Thank you for contacting the clinic with your observations.  As you know, she is a patient with Alzheimer's disease.  She also has a rest tremor.  She is accompanied by her daughter, Mell, and son, Charan, as usual.      She is residing at [facility].  It seems like she is doing reasonably well in this setting.  It does not seem like she is getting agitated as she was now that she is on Seroquel.  You noted that this tends to occur if she is taken out of the memory care unit for appointments and you wondered about limiting her visits because of this.  I did share this information with Mell, her daughter.      She continues on donepezil 10 mg a day, Namenda 10 mg twice a day and Seroquel 12.5 mg twice a day.  She has been switched now from escitalopram to sertraline 50 mg.      PHYSICAL EXAMINATION:   GENERAL:  Examination reveals she is pleasant and cooperative.   VITAL SIGNS:  Heart rate 67.  Blood pressure 145/80.   NEUROLOGIC:  She knows she is at a doctor's office and she knew my name, but she is not oriented to the city.  She does know the state.  She is fully disoriented to time.  She indicates it is either summer or fall and the month is July.  Her spontaneous speech is normal.  She is able to name and repeat.  She could spell the word \"world\" backwards.  She recalled 0/3 objects.      She does have an intermittent rest tremor of the right and the left hand.  There is mild cogwheeling in the left upper extremity.  She gets up and ambulates with a stable gait.  She has a reduction of her arm swing on the left and an intermittent tremor of the right hand.      IMPRESSION:   1.  Alzheimer disease.   2.  Rest tremor " -- possible early Parkinson's.      PLAN:  I am not making any change in her medications.      I would not start her on any medication for parkinsonism at this point as the treatment might be worse than the symptoms at this time.  I did discuss this with them.      I would be open to just seeing Ms. Moscoso on an as-needed basis as you suggested and I did discuss this with the patient and her children.  I would always be happy to see her back.  I did tell them that I am retiring at the end of  and if she followed up at this clinic, it would be with Dr. Antelmo Mauro at that point and I highly recommended Dr. Mauro.      Total visit time 20 minutes.         GAGE ARORA MD             D: 2021   T: 2021   MT: IRMA      Name:     HUBER MOSCOSO   MRN:      [id number]        Account:      [id number]   :      1945           Visit Date:   2021      Document: [id number]       cc: YUMIKO SHEPHERD NP

## 2021-10-28 NOTE — LETTER
"    8/12/2020         RE: Ashli Moscoso  C/o Jillian [facility]  [address] [place] [address]        Dear Colleague,    Thank you for referring your patient, Ashli Moscoso, to the [facility]. Please see a copy of my visit note below.    Visit Date:   08/12/2020      Ashli Moscoso returns for followup of Alzheimer disease.  She is accompanied by her daughter Jillian and son Charan today.      She was having a great deal of difficulty when I did a video visit back in June.  She was \"sundowning.\"  She was having some elements of delusions and paranoia at that time.      Since then, she has increased her Namenda dose to 10 mg twice a day.  Her quetiapine dose has been changed from 25 mg at night to 12.5 mg twice a day.  She has continued on escitalopram.  A couple of medications have been eliminated, including niacin and fenofibrate.      Overall, she seems better.  She is tired in the evenings, but her attitude is better, and she is not getting agitated, which is definitely an improvement.      PHYSICAL EXAMINATION:   GENERAL:  Exam today reveals she is pleasant and cooperative.   VITAL SIGNS:  Heart rate 67.  Blood pressure 139/84.   MENTAL STATUS:  She recognizes me and gets my name correct.  She knows she is at my office, but cannot give me the exact city.  She does know she is in [place].  She is totally disoriented to date.  She cannot give me the season.  She does well with confrontational naming and repetition.  She recalls 0/3 objects.     NEUROLOGIC:  She does have a low amplitude rest tremor of the right hand intermittently.  There is no change in her tone.  She ambulates with a fairly stable gait, although she does have a reduction of her right arm swing.      IMPRESSION:   1.  Alzheimer disease.   2.  Right hand tremor.      PLAN:  Overall, she appears to be better in terms of agitation since the medication changes were made.  I am not adding anything today.      I did note the tremor " today.  I noted a postural tremor in the past, but not a rest tremor.  She does not have any other prominent features of Parkinson disease, and I certainly would not start her on a medication for such, in view of her cognitive issues.  I will just continue to monitor this.      I am going to see her back in 6 months.         GAGE MERCHANT MD             D: 2020   T: 2020   MT: DARLIN      Name:     HUBER MCDANIELS   MRN:      [id number]        Account:      [id number]   :      1945           Visit Date:   2020      Document: [id number]        Again, thank you for allowing me to participate in the care of your patient.        Sincerely,        Gage Merchant MD     none

## 2024-01-18 NOTE — PROVIDER NOTIFICATION
"Call placed to hospitalist: \"FYI: Patient is under the impression that she is discharging back to her memory care today at 1200. I believe her daughter will be here at that time. Thanks!\"    Received return call.  Nursing to clarify with neurosurgery when she needs to follow up with them and when she can restart her Aspirin.  Text page sent.  " 5